# Patient Record
Sex: FEMALE | Race: WHITE | Employment: UNEMPLOYED | URBAN - METROPOLITAN AREA
[De-identification: names, ages, dates, MRNs, and addresses within clinical notes are randomized per-mention and may not be internally consistent; named-entity substitution may affect disease eponyms.]

---

## 2017-07-29 ENCOUNTER — HOSPITAL ENCOUNTER (EMERGENCY)
Age: 27
Discharge: HOME | End: 2017-07-29
Attending: EMERGENCY MEDICINE
Payer: SELF-PAY

## 2017-07-29 VITALS
HEIGHT: 61 IN | OXYGEN SATURATION: 98 % | SYSTOLIC BLOOD PRESSURE: 128 MMHG | HEART RATE: 93 BPM | RESPIRATION RATE: 18 BRPM | WEIGHT: 135 LBS | DIASTOLIC BLOOD PRESSURE: 80 MMHG | BODY MASS INDEX: 25.49 KG/M2 | TEMPERATURE: 98.6 F

## 2017-07-29 DIAGNOSIS — N30.00 ACUTE CYSTITIS WITHOUT HEMATURIA: Primary | ICD-10-CM

## 2017-07-29 LAB
APPEARANCE UR: CLEAR
BACTERIA URNS QL MICRO: ABNORMAL /HPF
BILIRUB UR QL: NEGATIVE
CLUE CELLS VAG QL WET PREP: NORMAL
COLOR UR: ABNORMAL
EPITH CASTS URNS QL MICRO: ABNORMAL /LPF
GLUCOSE UR STRIP.AUTO-MCNC: NEGATIVE MG/DL
HCG UR QL: NEGATIVE
HGB UR QL STRIP: NEGATIVE
HYALINE CASTS URNS QL MICRO: ABNORMAL /LPF (ref 0–5)
KETONES UR QL STRIP.AUTO: NEGATIVE MG/DL
KOH PREP SPEC: NORMAL
LEUKOCYTE ESTERASE UR QL STRIP.AUTO: ABNORMAL
NITRITE UR QL STRIP.AUTO: NEGATIVE
PH UR STRIP: 6.5 [PH] (ref 5–8)
PROT UR STRIP-MCNC: NEGATIVE MG/DL
RBC #/AREA URNS HPF: ABNORMAL /HPF (ref 0–5)
SERVICE CMNT-IMP: NORMAL
SP GR UR REFRACTOMETRY: 1.02 (ref 1–1.03)
T VAGINALIS VAG QL WET PREP: NORMAL
UROBILINOGEN UR QL STRIP.AUTO: 0.2 EU/DL (ref 0.2–1)
WBC URNS QL MICRO: ABNORMAL /HPF (ref 0–4)

## 2017-07-29 PROCEDURE — 99284 EMERGENCY DEPT VISIT MOD MDM: CPT

## 2017-07-29 PROCEDURE — 87210 SMEAR WET MOUNT SALINE/INK: CPT | Performed by: EMERGENCY MEDICINE

## 2017-07-29 PROCEDURE — 81025 URINE PREGNANCY TEST: CPT

## 2017-07-29 PROCEDURE — 87491 CHLMYD TRACH DNA AMP PROBE: CPT | Performed by: EMERGENCY MEDICINE

## 2017-07-29 PROCEDURE — 81001 URINALYSIS AUTO W/SCOPE: CPT | Performed by: EMERGENCY MEDICINE

## 2017-07-29 RX ORDER — CIPROFLOXACIN 500 MG/1
500 TABLET ORAL 2 TIMES DAILY
Qty: 14 TAB | Refills: 0 | Status: SHIPPED | OUTPATIENT
Start: 2017-07-29 | End: 2017-08-05

## 2017-07-29 NOTE — DISCHARGE INSTRUCTIONS
Urinary Tract Infection in Women: Care Instructions  Your Care Instructions    A urinary tract infection, or UTI, is a general term for an infection anywhere between the kidneys and the urethra (where urine comes out). Most UTIs are bladder infections. They often cause pain or burning when you urinate. UTIs are caused by bacteria and can be cured with antibiotics. Be sure to complete your treatment so that the infection goes away. Follow-up care is a key part of your treatment and safety. Be sure to make and go to all appointments, and call your doctor if you are having problems. It's also a good idea to know your test results and keep a list of the medicines you take. How can you care for yourself at home? · Take your antibiotics as directed. Do not stop taking them just because you feel better. You need to take the full course of antibiotics. · Drink extra water and other fluids for the next day or two. This may help wash out the bacteria that are causing the infection. (If you have kidney, heart, or liver disease and have to limit fluids, talk with your doctor before you increase your fluid intake.)  · Avoid drinks that are carbonated or have caffeine. They can irritate the bladder. · Urinate often. Try to empty your bladder each time. · To relieve pain, take a hot bath or lay a heating pad set on low over your lower belly or genital area. Never go to sleep with a heating pad in place. To prevent UTIs  · Drink plenty of water each day. This helps you urinate often, which clears bacteria from your system. (If you have kidney, heart, or liver disease and have to limit fluids, talk with your doctor before you increase your fluid intake.)  · Urinate when you need to. · Urinate right after you have sex. · Change sanitary pads often. · Avoid douches, bubble baths, feminine hygiene sprays, and other feminine hygiene products that have deodorants.   · After going to the bathroom, wipe from front to back.  When should you call for help? Call your doctor now or seek immediate medical care if:  · Symptoms such as fever, chills, nausea, or vomiting get worse or appear for the first time. · You have new pain in your back just below your rib cage. This is called flank pain. · There is new blood or pus in your urine. · You have any problems with your antibiotic medicine. Watch closely for changes in your health, and be sure to contact your doctor if:  · You are not getting better after taking an antibiotic for 2 days. · Your symptoms go away but then come back. Where can you learn more? Go to http://jenny-tej.info/. Enter B713 in the search box to learn more about \"Urinary Tract Infection in Women: Care Instructions. \"  Current as of: November 28, 2016  Content Version: 11.3  © 3845-9646 [employer]. Care instructions adapted under license by [employer] (which disclaims liability or warranty for this information). If you have questions about a medical condition or this instruction, always ask your healthcare professional. [address] any warranty or liability for your use of this information. We hope that we have addressed all of your medical concerns. The examination and treatment you received in the Emergency Department were for an emergent problem and were not intended as complete care. It is important that you follow up with your healthcare provider(s) for ongoing care. If your symptoms worsen or do not improve as expected, and you are unable to reach your usual health care provider(s), you should return to the Emergency Department. Today's healthcare is undergoing tremendous change, and patient satisfaction surveys are one of the many tools to assess the quality of medical care. You may receive a survey from the [employer] organization regarding your experience in the Emergency Department.   I hope that your experience has been completely positive, particularly the medical care that I provided. As such, please participate in the survey; anything less than excellent does not meet my expectations or intentions. [facility] and [facility] participate in nationally recognized quality of care measures. If your blood pressure is greater than 120/80, as reported below, we urge that you seek medical care to address the potential of high blood pressure, commonly known as hypertension. Hypertension can be hereditary or can be caused by certain medical conditions, pain, stress, or \"white coat syndrome. \"       Please make an appointment with your health care provider(s) for follow up of your Emergency Department visit. VITALS:   Patient Vitals for the past 8 hrs:   Temp Pulse Resp BP SpO2   07/29/17 1649 98.6 °F (37 °C) 93 18 128/80 98 %          Thank you for allowing us to provide you with medical care today. We realize that you have many choices for your emergency care needs. Please choose us in the future for any continued health care needs. Tunde Roberts  [facility], [address] [place] [address].   Office: [phone]            Recent Results (from the past 24 hour(s))   URINALYSIS W/MICROSCOPIC    Collection Time: 07/29/17  5:22 PM   Result Value Ref Range    Color YELLOW/STRAW      Appearance CLEAR CLEAR      Specific gravity 1.016 1.003 - 1.030      pH (UA) 6.5 5.0 - 8.0      Protein NEGATIVE  NEG mg/dL    Glucose NEGATIVE  NEG mg/dL    Ketone NEGATIVE  NEG mg/dL    Bilirubin NEGATIVE  NEG      Blood NEGATIVE  NEG      Urobilinogen 0.2 0.2 - 1.0 EU/dL    Nitrites NEGATIVE  NEG      Leukocyte Esterase SMALL (A) NEG      WBC 20-50 0 - 4 /hpf    RBC 5-10 0 - 5 /hpf    Epithelial cells FEW FEW /lpf    Bacteria 1+ (A) NEG /hpf    Hyaline cast 0-2 0 - 5 /lpf   HCG URINE, QL. - POC    Collection Time: 07/29/17  5:23 PM   Result Value Ref Range Pregnancy test,urine (POC) NEGATIVE  NEG     KOH, OTHER SOURCES    Collection Time: 07/29/17  5:35 PM   Result Value Ref Range    Special Requests: NO SPECIAL REQUESTS      KOH NO YEAST SEEN     WET PREP    Collection Time: 07/29/17  5:35 PM   Result Value Ref Range    Clue cells CLUE CELLS ABSENT      Wet prep NO TRICHOMONAS SEEN         No results found.

## 2017-07-29 NOTE — ED PROVIDER NOTES
HPI Comments: 32 y.o. female with no significant past medical history who presents from home with boyfriend at bedside with chief complaint of dysuria. Pt c/o dysuria, suprapubic pain, chills, back pain, and urinary urgency that has persisted for a week. LMP started on the 20th and is current. The pt has had a prior UTI and it feels similar. The pt denies vaginal discharge, fever, dyspareunia, prior abdominal surgery, and prior vaginal infections. There are no other acute medical concerns at this time. Social hx: nonsmoker, no EtOH use  PCP: No primary care provider on file. Note written by Jeremy Victor, as dictated by Judge Daniels. Maicol Carr MD 5:03 PM      The history is provided by the patient. No  was used. No past medical history on file. No past surgical history on file. No family history on file. Social History     Social History    Marital status: N/A     Spouse name: N/A    Number of children: N/A    Years of education: N/A     Occupational History    Not on file. Social History Main Topics    Smoking status: Not on file    Smokeless tobacco: Not on file    Alcohol use Not on file    Drug use: Not on file    Sexual activity: Not on file     Other Topics Concern    Not on file     Social History Narrative    No narrative on file         ALLERGIES: Review of patient's allergies indicates no known allergies. Review of Systems   Constitutional: Negative for chills and fever. HENT: Negative for ear pain and sore throat. Eyes: Negative for pain. Respiratory: Negative for chest tightness and shortness of breath. Cardiovascular: Negative for chest pain and leg swelling. Gastrointestinal: Negative for abdominal pain, nausea and vomiting. Genitourinary: Positive for dyspareunia, dysuria and pelvic pain. Negative for flank pain. Musculoskeletal: Negative for back pain. Skin: Negative for rash.    Neurological: Negative for headaches. All other systems reviewed and are negative. Vitals:    07/29/17 1649   BP: 128/80   Pulse: 93   Resp: 18   Temp: 98.6 °F (37 °C)   SpO2: 98%   Weight: 61.2 kg (135 lb)   Height: 5' 1\" (1.549 m)            Physical Exam   Constitutional: She is oriented to person, place, and time. She appears well-developed and well-nourished. HENT:   Head: Normocephalic and atraumatic. Mouth/Throat: Oropharynx is clear and moist.   Eyes: Conjunctivae and EOM are normal. Pupils are equal, round, and reactive to light. No scleral icterus. Neck: Neck supple. No tracheal deviation present. Cardiovascular: Normal rate, regular rhythm, normal heart sounds and intact distal pulses. Pulmonary/Chest: Effort normal and breath sounds normal. No respiratory distress. Abdominal: Soft. She exhibits no distension. There is no tenderness. There is no rebound and no guarding. Mild B/L abdominal tenderness;     Genitourinary: Vagina normal. No vaginal discharge found. Genitourinary Comments: Left and right adnexal tenderness. No cervical motion tenderness   Musculoskeletal: She exhibits no edema. Neurological: She is alert and oriented to person, place, and time. Skin: Skin is warm and dry. Psychiatric: She has a normal mood and affect. Nursing note and vitals reviewed. Note written by Jeremy Felix, as dictated by Loc Ayers. Soumya Wilkinson MD 5:04 PM      MDM  Number of Diagnoses or Management Options  Acute cystitis without hematuria:   Diagnosis management comments: 32 F with dysuria.     Pelvic exam equivocal  UA shows pyuria and 1+ bacteria  Mild CVA tenderness    A/P: UTI- will treat as early pyelonephritis, Cipro, f/u cultures, Return to the emergency department for new/ worsening symptoms or other concerns       Patient Progress  Patient progress: stable    ED Course       Procedures

## 2017-07-31 LAB
C TRACH DNA SPEC QL NAA+PROBE: NEGATIVE
N GONORRHOEA DNA SPEC QL NAA+PROBE: NEGATIVE
SAMPLE TYPE: NORMAL
SERVICE CMNT-IMP: NORMAL
SPECIMEN SOURCE: NORMAL

## 2018-08-06 LAB
ANTIBODY SCREEN, EXTERNAL: NEGATIVE
HBSAG, EXTERNAL: NEGATIVE
HIV, EXTERNAL: NON REACTIVE
RUBELLA, EXTERNAL: NORMAL
T. PALLIDUM, EXTERNAL: NEGATIVE
TYPE, ABO & RH, EXTERNAL: NORMAL

## 2019-02-27 ENCOUNTER — HOSPITAL ENCOUNTER (INPATIENT)
Age: 29
LOS: 3 days | Discharge: HOME | DRG: 560 | End: 2019-03-02
Attending: OBSTETRICS & GYNECOLOGY | Admitting: OBSTETRICS & GYNECOLOGY
Payer: COMMERCIAL

## 2019-02-27 PROBLEM — O41.00X0 OLIGOHYDRAMNIOS: Status: ACTIVE | Noted: 2019-02-27

## 2019-02-27 LAB
ERYTHROCYTE [DISTWIDTH] IN BLOOD BY AUTOMATED COUNT: 13.5 % (ref 11.5–14.5)
GRBS, EXTERNAL: POSITIVE
HCT VFR BLD AUTO: 37.8 % (ref 35–47)
HGB BLD-MCNC: 12.4 G/DL (ref 11.5–16)
MCH RBC QN AUTO: 32 PG (ref 26–34)
MCHC RBC AUTO-ENTMCNC: 32.8 G/DL (ref 30–36.5)
MCV RBC AUTO: 97.4 FL (ref 80–99)
NRBC # BLD: 0 K/UL (ref 0–0.01)
NRBC BLD-RTO: 0 PER 100 WBC
PLATELET # BLD AUTO: 171 K/UL (ref 150–400)
PMV BLD AUTO: 11.8 FL (ref 8.9–12.9)
RBC # BLD AUTO: 3.88 M/UL (ref 3.8–5.2)
WBC # BLD AUTO: 10.4 K/UL (ref 3.6–11)

## 2019-02-27 PROCEDURE — 85027 COMPLETE CBC AUTOMATED: CPT

## 2019-02-27 PROCEDURE — 3E0P7VZ INTRODUCTION OF HORMONE INTO FEMALE REPRODUCTIVE, VIA NATURAL OR ARTIFICIAL OPENING: ICD-10-PCS | Performed by: OBSTETRICS & GYNECOLOGY

## 2019-02-27 PROCEDURE — 75410000002 HC LABOR FEE PER 1 HR

## 2019-02-27 PROCEDURE — 36415 COLL VENOUS BLD VENIPUNCTURE: CPT

## 2019-02-27 PROCEDURE — 59200 INSERT CERVICAL DILATOR: CPT

## 2019-02-27 PROCEDURE — 65270000029 HC RM PRIVATE

## 2019-02-27 PROCEDURE — 74011250637 HC RX REV CODE- 250/637: Performed by: OBSTETRICS & GYNECOLOGY

## 2019-02-27 RX ORDER — MAG HYDROX/ALUMINUM HYD/SIMETH 200-200-20
30 SUSPENSION, ORAL (FINAL DOSE FORM) ORAL
Status: DISCONTINUED | OUTPATIENT
Start: 2019-02-27 | End: 2019-02-28 | Stop reason: HOSPADM

## 2019-02-27 RX ORDER — SODIUM CHLORIDE 0.9 % (FLUSH) 0.9 %
5-40 SYRINGE (ML) INJECTION EVERY 8 HOURS
Status: DISCONTINUED | OUTPATIENT
Start: 2019-02-27 | End: 2019-03-01

## 2019-02-27 RX ORDER — ONDANSETRON 2 MG/ML
4 INJECTION INTRAMUSCULAR; INTRAVENOUS
Status: DISCONTINUED | OUTPATIENT
Start: 2019-02-27 | End: 2019-02-28 | Stop reason: HOSPADM

## 2019-02-27 RX ORDER — ACETAMINOPHEN 325 MG/1
650 TABLET ORAL
Status: DISCONTINUED | OUTPATIENT
Start: 2019-02-27 | End: 2019-02-28 | Stop reason: HOSPADM

## 2019-02-27 RX ORDER — SODIUM CHLORIDE, SODIUM LACTATE, POTASSIUM CHLORIDE, CALCIUM CHLORIDE 600; 310; 30; 20 MG/100ML; MG/100ML; MG/100ML; MG/100ML
125 INJECTION, SOLUTION INTRAVENOUS CONTINUOUS
Status: DISCONTINUED | OUTPATIENT
Start: 2019-02-27 | End: 2019-03-01

## 2019-02-27 RX ORDER — OXYTOCIN/0.9 % SODIUM CHLORIDE 30/500 ML
0-25 PLASTIC BAG, INJECTION (ML) INTRAVENOUS
Status: DISCONTINUED | OUTPATIENT
Start: 2019-02-28 | End: 2019-03-01

## 2019-02-27 RX ORDER — ZOLPIDEM TARTRATE 5 MG/1
5 TABLET ORAL
Status: DISCONTINUED | OUTPATIENT
Start: 2019-02-27 | End: 2019-03-02 | Stop reason: HOSPADM

## 2019-02-27 RX ORDER — NALBUPHINE HYDROCHLORIDE 10 MG/ML
10 INJECTION, SOLUTION INTRAMUSCULAR; INTRAVENOUS; SUBCUTANEOUS
Status: DISCONTINUED | OUTPATIENT
Start: 2019-02-27 | End: 2019-02-28 | Stop reason: HOSPADM

## 2019-02-27 RX ORDER — BUTALBITAL, ASPIRIN, AND CAFFEINE 325; 50; 40 MG/1; MG/1; MG/1
1 CAPSULE ORAL
COMMUNITY
End: 2019-03-02

## 2019-02-27 RX ORDER — SODIUM CHLORIDE 0.9 % (FLUSH) 0.9 %
5-40 SYRINGE (ML) INJECTION AS NEEDED
Status: DISCONTINUED | OUTPATIENT
Start: 2019-02-27 | End: 2019-03-02 | Stop reason: HOSPADM

## 2019-02-27 RX ORDER — NALOXONE HYDROCHLORIDE 0.4 MG/ML
0.4 INJECTION, SOLUTION INTRAMUSCULAR; INTRAVENOUS; SUBCUTANEOUS AS NEEDED
Status: DISCONTINUED | OUTPATIENT
Start: 2019-02-27 | End: 2019-02-28 | Stop reason: HOSPADM

## 2019-02-27 RX ADMIN — DINOPROSTONE 10 MG: 10 INSERT VAGINAL at 18:57

## 2019-02-27 RX ADMIN — ZOLPIDEM TARTRATE 5 MG: 5 TABLET ORAL at 22:44

## 2019-02-27 NOTE — H&P
History & Physical 
 
Name: Vale Mckenna MRN: [id number]  SSN: [id number] YOB: 1990  Age: 34 y.o. Sex: female Subjective:  
 
Estimated Date of Delivery: 3/3/19 OB History  Para Term  AB Living 1 SAB TAB Ectopic Molar Multiple Live Births # Outcome Date GA Lbr Dilshad/2nd Weight Sex Delivery Anes PTL Lv  
1 Current Ms. Magnus Vargas is admitted with pregnancy at 39w3d for induction of labor due to decreased FM and MARVEL 5 today. Prenatal course was normal.  Please see prenatal records for details. History reviewed. No pertinent past medical history. History reviewed. No pertinent surgical history. Social History Occupational History  Not on file Tobacco Use  Smoking status: Never Smoker  Smokeless tobacco: Never Used Substance and Sexual Activity  Alcohol use: No  
 Drug use: No  
 Sexual activity: Not on file History reviewed. No pertinent family history. No Known Allergies Prior to Admission medications Medication Sig Start Date End Date Taking? Authorizing Provider  
butalbital-aspirin-caffeine [facility]) capsule Take 1 Cap by mouth every four (4) hours as needed for Pain. Yes Provider, Historical  
[id number] ck-folic-dha (PRENATAL DHA+COMPLETE PRENATAL) [id number] mg-mcg-mg cmpk Take  by mouth. Yes Provider, Historical  
aspirin-acetaminophen-caffeine (EXCEDRIN ES) 250-250-65 mg per tablet Take 1 Tab by mouth. Other, MD Derek  
  
 
Review of Systems: A comprehensive review of systems was negative except for that written in the History of Present Illness. Objective:  
 
Vitals: 
Vitals:  
 19 1813 Weight: 85.3 kg (188 lb) Height: 5' 1\" (1.549 m) Physical Exam: 
Patient without distress. Heart: Regular rate and rhythm Lung: clear to auscultation throughout lung fields, no wheezes, no rales, no rhonchi and normal respiratory effort Abdomen: soft, nontender Fundus: soft and non tender Perineum: blood absent, amniotic fluid absent Cervical Exam: 1/60/-2 vertex Lower Extremities:  - Edema No 
Membranes:  Intact Fetal Heart Rate: Reactive EFW: 8# 
Cervidil placed Prenatal Labs: A+, RI, Hep B neg, HIV neg, RPR neg, GBS pos Impression/Plan: Active Problems: 
  Oligohydramnios (2/27/2019) Plan: Admit for induction of labor. Group B Strep positive, will treat prophylactically with ampicillin. Signed By:  Gail Sullivan MD   
 February 27, 2019

## 2019-02-27 NOTE — ROUTINE PROCESS
1805: patient arrived ambulatory to LD3 for scheduled induction. Patient reports positive fetal movement; denies leaking of fluid or bleeding.

## 2019-02-28 ENCOUNTER — ANESTHESIA EVENT (OUTPATIENT)
Dept: LABOR AND DELIVERY | Age: 29
DRG: 560 | End: 2019-02-28
Payer: COMMERCIAL

## 2019-02-28 ENCOUNTER — ANESTHESIA (OUTPATIENT)
Dept: LABOR AND DELIVERY | Age: 29
DRG: 560 | End: 2019-02-28
Payer: COMMERCIAL

## 2019-02-28 PROCEDURE — 77030011943

## 2019-02-28 PROCEDURE — 76060000078 HC EPIDURAL ANESTHESIA

## 2019-02-28 PROCEDURE — 3E033VJ INTRODUCTION OF OTHER HORMONE INTO PERIPHERAL VEIN, PERCUTANEOUS APPROACH: ICD-10-PCS | Performed by: OBSTETRICS & GYNECOLOGY

## 2019-02-28 PROCEDURE — 0HQ9XZZ REPAIR PERINEUM SKIN, EXTERNAL APPROACH: ICD-10-PCS | Performed by: OBSTETRICS & GYNECOLOGY

## 2019-02-28 PROCEDURE — 74011000250 HC RX REV CODE- 250: Performed by: ANESTHESIOLOGY

## 2019-02-28 PROCEDURE — 77030031139 HC SUT VCRL2 J&J -A

## 2019-02-28 PROCEDURE — 00HU33Z INSERTION OF INFUSION DEVICE INTO SPINAL CANAL, PERCUTANEOUS APPROACH: ICD-10-PCS | Performed by: ANESTHESIOLOGY

## 2019-02-28 PROCEDURE — 74011250636 HC RX REV CODE- 250/636: Performed by: ANESTHESIOLOGY

## 2019-02-28 PROCEDURE — 77030014125 HC TY EPDRL BBMI -B: Performed by: ANESTHESIOLOGY

## 2019-02-28 PROCEDURE — 74011000250 HC RX REV CODE- 250

## 2019-02-28 PROCEDURE — A4300 CATH IMPL VASC ACCESS PORTAL: HCPCS

## 2019-02-28 PROCEDURE — 10907ZC DRAINAGE OF AMNIOTIC FLUID, THERAPEUTIC FROM PRODUCTS OF CONCEPTION, VIA NATURAL OR ARTIFICIAL OPENING: ICD-10-PCS | Performed by: OBSTETRICS & GYNECOLOGY

## 2019-02-28 PROCEDURE — 74011000258 HC RX REV CODE- 258: Performed by: OBSTETRICS & GYNECOLOGY

## 2019-02-28 PROCEDURE — 75410000002 HC LABOR FEE PER 1 HR

## 2019-02-28 PROCEDURE — 77030034849

## 2019-02-28 PROCEDURE — 75410000000 HC DELIVERY VAGINAL/SINGLE

## 2019-02-28 PROCEDURE — 74011250636 HC RX REV CODE- 250/636: Performed by: OBSTETRICS & GYNECOLOGY

## 2019-02-28 PROCEDURE — 65410000002 HC RM PRIVATE OB

## 2019-02-28 PROCEDURE — 75410000003 HC RECOV DEL/VAG/CSECN EA 0.5 HR

## 2019-02-28 PROCEDURE — 74011250637 HC RX REV CODE- 250/637: Performed by: OBSTETRICS & GYNECOLOGY

## 2019-02-28 RX ORDER — FENTANYL CITRATE 50 UG/ML
INJECTION, SOLUTION INTRAMUSCULAR; INTRAVENOUS AS NEEDED
Status: DISCONTINUED | OUTPATIENT
Start: 2019-02-28 | End: 2019-02-28 | Stop reason: HOSPADM

## 2019-02-28 RX ORDER — HYDROCORTISONE ACETATE PRAMOXINE HCL 2.5; 1 G/100G; G/100G
CREAM TOPICAL AS NEEDED
Status: DISCONTINUED | OUTPATIENT
Start: 2019-02-28 | End: 2019-03-02 | Stop reason: HOSPADM

## 2019-02-28 RX ORDER — LIDOCAINE HYDROCHLORIDE AND EPINEPHRINE 15; 5 MG/ML; UG/ML
INJECTION, SOLUTION EPIDURAL AS NEEDED
Status: DISCONTINUED | OUTPATIENT
Start: 2019-02-28 | End: 2019-02-28 | Stop reason: HOSPADM

## 2019-02-28 RX ORDER — SIMETHICONE 80 MG
80 TABLET,CHEWABLE ORAL
Status: DISCONTINUED | OUTPATIENT
Start: 2019-02-28 | End: 2019-03-02 | Stop reason: HOSPADM

## 2019-02-28 RX ORDER — IBUPROFEN 400 MG/1
800 TABLET ORAL EVERY 8 HOURS
Status: DISCONTINUED | OUTPATIENT
Start: 2019-02-28 | End: 2019-03-02 | Stop reason: HOSPADM

## 2019-02-28 RX ORDER — NALBUPHINE HYDROCHLORIDE 10 MG/ML
2.5 INJECTION, SOLUTION INTRAMUSCULAR; INTRAVENOUS; SUBCUTANEOUS
Status: DISCONTINUED | OUTPATIENT
Start: 2019-02-28 | End: 2019-02-28 | Stop reason: HOSPADM

## 2019-02-28 RX ORDER — HYDROCORTISONE 1 %
CREAM (GRAM) TOPICAL AS NEEDED
Status: DISCONTINUED | OUTPATIENT
Start: 2019-02-28 | End: 2019-03-02 | Stop reason: HOSPADM

## 2019-02-28 RX ORDER — EPHEDRINE SULFATE 50 MG/ML
10 INJECTION, SOLUTION INTRAVENOUS
Status: DISCONTINUED | OUTPATIENT
Start: 2019-02-28 | End: 2019-02-28 | Stop reason: HOSPADM

## 2019-02-28 RX ORDER — AMMONIA 15 % (W/V)
1 AMPUL (EA) INHALATION AS NEEDED
Status: DISCONTINUED | OUTPATIENT
Start: 2019-02-28 | End: 2019-03-02 | Stop reason: HOSPADM

## 2019-02-28 RX ORDER — BUPIVACAINE HYDROCHLORIDE 5 MG/ML
INJECTION, SOLUTION EPIDURAL; INTRACAUDAL AS NEEDED
Status: DISCONTINUED | OUTPATIENT
Start: 2019-02-28 | End: 2019-02-28 | Stop reason: HOSPADM

## 2019-02-28 RX ORDER — BUPIVACAINE HYDROCHLORIDE 5 MG/ML
30 INJECTION, SOLUTION EPIDURAL; INTRACAUDAL ONCE
Status: DISCONTINUED | OUTPATIENT
Start: 2019-02-28 | End: 2019-02-28 | Stop reason: HOSPADM

## 2019-02-28 RX ORDER — FENTANYL CITRATE 50 UG/ML
100 INJECTION, SOLUTION INTRAMUSCULAR; INTRAVENOUS ONCE
Status: COMPLETED | OUTPATIENT
Start: 2019-02-28 | End: 2019-02-28

## 2019-02-28 RX ORDER — ZOLPIDEM TARTRATE 5 MG/1
5 TABLET ORAL
Status: DISCONTINUED | OUTPATIENT
Start: 2019-02-28 | End: 2019-03-02 | Stop reason: HOSPADM

## 2019-02-28 RX ORDER — ACETAMINOPHEN 325 MG/1
650 TABLET ORAL
Status: DISCONTINUED | OUTPATIENT
Start: 2019-02-28 | End: 2019-03-02 | Stop reason: HOSPADM

## 2019-02-28 RX ORDER — SODIUM CHLORIDE 0.9 % (FLUSH) 0.9 %
5-40 SYRINGE (ML) INJECTION AS NEEDED
Status: DISCONTINUED | OUTPATIENT
Start: 2019-02-28 | End: 2019-03-02 | Stop reason: HOSPADM

## 2019-02-28 RX ORDER — OXYCODONE AND ACETAMINOPHEN 5; 325 MG/1; MG/1
2 TABLET ORAL
Status: DISCONTINUED | OUTPATIENT
Start: 2019-02-28 | End: 2019-03-02 | Stop reason: HOSPADM

## 2019-02-28 RX ORDER — OXYTOCIN/RINGER'S LACTATE 20/1000 ML
999 PLASTIC BAG, INJECTION (ML) INTRAVENOUS AS NEEDED
Status: DISCONTINUED | OUTPATIENT
Start: 2019-02-28 | End: 2019-03-02 | Stop reason: HOSPADM

## 2019-02-28 RX ORDER — DOCUSATE SODIUM 100 MG/1
100 CAPSULE, LIQUID FILLED ORAL
Status: DISCONTINUED | OUTPATIENT
Start: 2019-02-28 | End: 2019-03-02 | Stop reason: HOSPADM

## 2019-02-28 RX ORDER — DIPHENHYDRAMINE HCL 25 MG
25 CAPSULE ORAL
Status: DISCONTINUED | OUTPATIENT
Start: 2019-02-28 | End: 2019-03-02 | Stop reason: HOSPADM

## 2019-02-28 RX ORDER — SODIUM CHLORIDE 0.9 % (FLUSH) 0.9 %
5-40 SYRINGE (ML) INJECTION EVERY 8 HOURS
Status: DISCONTINUED | OUTPATIENT
Start: 2019-02-28 | End: 2019-03-01

## 2019-02-28 RX ORDER — ONDANSETRON 2 MG/ML
4 INJECTION INTRAMUSCULAR; INTRAVENOUS
Status: DISCONTINUED | OUTPATIENT
Start: 2019-02-28 | End: 2019-03-02 | Stop reason: HOSPADM

## 2019-02-28 RX ORDER — OXYTOCIN/RINGER'S LACTATE 20/1000 ML
125 PLASTIC BAG, INJECTION (ML) INTRAVENOUS AS NEEDED
Status: DISCONTINUED | OUTPATIENT
Start: 2019-02-28 | End: 2019-03-02 | Stop reason: HOSPADM

## 2019-02-28 RX ORDER — NALOXONE HYDROCHLORIDE 0.4 MG/ML
0.4 INJECTION, SOLUTION INTRAMUSCULAR; INTRAVENOUS; SUBCUTANEOUS AS NEEDED
Status: DISCONTINUED | OUTPATIENT
Start: 2019-02-28 | End: 2019-02-28 | Stop reason: HOSPADM

## 2019-02-28 RX ORDER — OXYCODONE AND ACETAMINOPHEN 5; 325 MG/1; MG/1
1 TABLET ORAL
Status: DISCONTINUED | OUTPATIENT
Start: 2019-02-28 | End: 2019-03-02 | Stop reason: HOSPADM

## 2019-02-28 RX ORDER — FENTANYL/BUPIVACAINE/NS/PF 2-1250MCG
1-16 PREFILLED PUMP RESERVOIR EPIDURAL CONTINUOUS
Status: DISCONTINUED | OUTPATIENT
Start: 2019-02-28 | End: 2019-03-01

## 2019-02-28 RX ADMIN — SODIUM CHLORIDE, SODIUM LACTATE, POTASSIUM CHLORIDE, AND CALCIUM CHLORIDE 125 ML/HR: 600; 310; 30; 20 INJECTION, SOLUTION INTRAVENOUS at 07:00

## 2019-02-28 RX ADMIN — SODIUM CHLORIDE, SODIUM LACTATE, POTASSIUM CHLORIDE, AND CALCIUM CHLORIDE 500 ML: 600; 310; 30; 20 INJECTION, SOLUTION INTRAVENOUS at 11:02

## 2019-02-28 RX ADMIN — OXYTOCIN-SODIUM CHLORIDE 0.9% IV SOLN 30 UNIT/500ML 2 MILLI-UNITS/MIN: 30-0.9/5 SOLUTION at 07:00

## 2019-02-28 RX ADMIN — BUPIVACAINE HYDROCHLORIDE 2 ML: 5 INJECTION, SOLUTION EPIDURAL; INTRACAUDAL at 11:09

## 2019-02-28 RX ADMIN — AMPICILLIN 2 G: 2 INJECTION, POWDER, FOR SOLUTION INTRAVENOUS at 07:00

## 2019-02-28 RX ADMIN — IBUPROFEN 800 MG: 400 TABLET ORAL at 19:14

## 2019-02-28 RX ADMIN — Medication 10 ML/HR: at 11:15

## 2019-02-28 RX ADMIN — LIDOCAINE HYDROCHLORIDE AND EPINEPHRINE 5 ML: 15; 5 INJECTION, SOLUTION EPIDURAL at 11:05

## 2019-02-28 RX ADMIN — BUPIVACAINE HYDROCHLORIDE 3 ML: 5 INJECTION, SOLUTION EPIDURAL; INTRACAUDAL at 11:10

## 2019-02-28 RX ADMIN — AMPICILLIN SODIUM 1 G: 1 INJECTION, POWDER, FOR SOLUTION INTRAMUSCULAR; INTRAVENOUS at 11:05

## 2019-02-28 RX ADMIN — FENTANYL CITRATE 100 MCG: 50 INJECTION, SOLUTION INTRAMUSCULAR; INTRAVENOUS at 11:08

## 2019-02-28 RX ADMIN — FENTANYL CITRATE 100 MCG: 50 INJECTION, SOLUTION INTRAMUSCULAR; INTRAVENOUS at 11:07

## 2019-02-28 RX ADMIN — AMPICILLIN SODIUM 1 G: 1 INJECTION, POWDER, FOR SOLUTION INTRAMUSCULAR; INTRAVENOUS at 14:56

## 2019-02-28 NOTE — PROGRESS NOTES
0745 Bedside and Verbal shift change report given to CASANDRA Bonilla RN (oncoming nurse) by Sarah Richard. Isak Flores RN (offgoing nurse). Report included the following information SBAR, MAR and Recent Results. 9974 Dr Randa Riedel at bedside and reviewed fhr strip. SVE 3/70/-3 
 
1035 Patient requesting epidural. LR bolus started 1 Dr Teo Roy notified and on way for epidural 
 
[place] Dr Teo Roy at bedside for epidural 
 
1145 Bedside and Verbal shift change report given to RONY Spencer RN (oncoming nurse) by CASANDRA Bonilla RN (offgoing nurse). Report included the following information SBAR, Intake/Output, MAR and Recent Results.

## 2019-02-28 NOTE — PROGRESS NOTES
Labor Progress Note 
 
34 y.o  at 39.3 wks. Was seen in office today with c/o decreased FM. MARVEL was 5cm so plan for IOL was mad. Cervidil was placed tonight by Dr. Sara Gilman. Plan for pitocin in the morning. Pt currently feeling FM. Denies any sig med/ob hx. FOB and friend at bedside. Physical Exam: 
Cervical Exam: not examined Membranes:  Intact Uterine Activity: Frequency: occasional, mild, not felt by pt Fetal Heart Rate: Reactive,130 moderate varaibility Accelerations: yes Decelerations: none Assessment/Plan: 
IUP 39.3 Reassuring FWB Borderline oligohydramnios Cervidil IOL, plan for pit At 0700 Aranza Vivas CNM

## 2019-02-28 NOTE — PROGRESS NOTES
Labor Progress Note Patient seen, fetal heart rate and contraction pattern evaluated, patient examined. Comfortable with epidural. 
Patient Vitals for the past 1 hrs: 
 BP Temp Pulse Resp SpO2  
02/28/19 1200 122/66 98.4 °F (36.9 °C) 81 18   
02/28/19 1156     99 % Physical Exam: 
Cervical Exam:  5/80/-3 Membranes:  Artificial Rupture of Membranes; Amniotic Fluid: small amount of thick meconium fluid Uterine Activity: ctx every 2, pit at 8; iupc inserted Fetal Heart Rate: 150s mod +accels prior to AROM; recurrent mild variables Assessment/Plan: 
IOL due to decreased FM, MARVEL 5 at 39+ weeks. Progressing however head position has not descended - discussed with pt and family I'm not optimistic for a vaginal birth due to possible CPD. IUPC placed to ensure adequate ctx. Will work on position changes now to see if variables resolve. GBS positive s/p 2 doses of antibiotics.

## 2019-02-28 NOTE — PROGRESS NOTES
Labor Progress Note Patient seen, fetal heart rate and contraction pattern evaluated, patient examined. C/o cramps Visit Vitals /90 Pulse 80 Temp 98.4 °F (36.9 °C) Resp 16 Ht 5' 1\" (1.549 m) Wt 85.3 kg (188 lb) BMI 35.52 kg/m² Physical Exam: 
Cervical Exam:  3/70/-3 Membranes:  Intact Uterine Activity: irregular ctx, pit at 6 Fetal Heart Rate: reassuring Assessment/Plan: 
iol due to meagan 5 with decreased fm. Cervical ripening successful-continue pitocin. fht reassuring.

## 2019-02-28 NOTE — ANESTHESIA PROCEDURE NOTES
Epidural Block Performed by: Comfort Chacon DO Authorized by: Comfort Chacon DO  
 
Pre-Procedure Indication: labor epidural   
Preanesthetic Checklist: patient identified, risks and benefits discussed, anesthesia consent, site marked, patient being monitored, timeout performed and anesthesia consent Epidural:  
Patient position:  Seated Prep region:  Lumbar Prep: Chlorhexidine Location:  L3-4 Needle and Epidural Catheter:  
Needle Type:  Tuohy Needle Gauge:  17 G Injection Technique:  Loss of resistance using air Attempts:  1 Catheter Size:  19 G Events: no blood with aspiration, no cerebrospinal fluid with aspiration, no paresthesia and negative aspiration test   
Test Dose:  Bupivacaine 0.25% and negative Assessment:  
Catheter Secured:  Tegaderm and tape Insertion:  Uncomplicated Patient tolerance:  Patient tolerated the procedure well with no immediate complications Patient reported comfort

## 2019-02-28 NOTE — PROGRESS NOTES
1940 Bedside and Verbal shift change report given to RYLAND Al, RN by CINTHIA Petersen, RN. Report included the following information SBAR, Intake/Output and MAR.  
 
1943 MAYNOR Castillo at the bedside. Reviewing FHT and discussing plan of care. 0600 Cervidil removed by patient. Cervidil intact. Patient up to shower before starting Pitocin at 0700. 
 
0745 Bedside and Verbal shift change report given to CASANDRA Hannon RN by Kinsey Al, ANGELA. Report included the following information SBAR, Intake/Output and MAR.

## 2019-02-28 NOTE — PROGRESS NOTES
Bedside and Verbal shift change report given to SRINATH Rhodes RN (oncoming nurse) by CASANDRA Day RN (offgoing nurse). Report included the following information SBAR, Kardex, Intake/Output, MAR and Recent Results. Pt recently got her epidural, states comfortable at present. Turned to R side. 1230:  Dr. Valerio Boas at bedside, cervical exam:  80/-3, AROM per Dr. Valerio Boas, noted small amt of thick meconium. IUPC placed per Dr. Valerio Boas. Dr. Valerio Boas discussed with pt regarding possible large size of baby and possibility of c/section. 1244:  Spoke with Dr. Valerio Boas about recurrent variable decels and rising FHR BL, Dr. Valerio Boas viewed FHR strip, states we will continue to observe;  pt's position changed. 1500:  Notified Dr. Valerio Boas of pt's increased rectal pressure, requested for nurse to do cervical exam. 
[phone] 90:  Cervical exam per Pablo Cedeno RN:  10/100/0/+1. 
1525:  Dr. Valerio Boas notified of pt being complete and will begin pushing. 1542:  Pt beginning to push, nurse is at bedside continuously while pushing. 1628:  Dr. Valerio Boas notified have been pushing for 1 hour, asked to come evaluate pt. States she will be over soon. 1650:  Pushing with every other UC. 
1658:  Dr. Valerio Boas arrived for delivery. 1715:  Delivered viable female infant  per Dr. Valerio Boas, no episiotomy, 1st degree perineal laceration repaired by Dr. Valerio Boas. 1846:  Infant to R breast.  Discussed with mother her plan for feeding her baby. Reviewed the benefits and management of exclusive breast milk feeding as well as the importance of latching within the first hour after delivery. Instruction provided on how to recognize hunger cues and initiate breast feeding in response to those cues. Patient confirms breast milk feed exclusively as intended feeding method at this time. 1850:  Pt bathed, Epidural cath removed intact, no s/s of infection. :  TRANSFER - OUT REPORT: 
 
Verbal report given to RYLAND Chirinos RN(name) on [employer]  being transferred to [place] [address](unit) for routine progression of care Report consisted of patients Situation, Background, Assessment and  
Recommendations(SBAR). Information from the following report(s) SBAR, Kardex, Intake/Output, MAR and Recent Results was reviewed with the receiving nurse. Lines:  
Peripheral IV 02/27/19 Left Arm (Active) Site Assessment Clean, dry, & intact 2/28/2019  7:47 AM  
Phlebitis Assessment 0 2/28/2019  7:47 AM  
Infiltration Assessment 0 2/28/2019  7:47 AM  
Dressing Status Clean, dry, & intact 2/28/2019  7:47 AM  
Dressing Type Transparent;Tape 2/28/2019  7:47 AM  
Hub Color/Line Status Green 2/28/2019  7:47 AM  
  
 
Opportunity for questions and clarification was provided. Patient transported with: 
 Registered Nurse

## 2019-02-28 NOTE — ANESTHESIA PREPROCEDURE EVALUATION
Anesthetic History No history of anesthetic complications Review of Systems / Medical History Patient summary reviewed, nursing notes reviewed and pertinent labs reviewed Pulmonary Within defined limits Neuro/Psych Within defined limits Cardiovascular Within defined limits Exercise tolerance: >4 METS 
  
GI/Hepatic/Renal 
Within defined limits Endo/Other Within defined limits Other Findings Comments: Oligohydramnios IOL Physical Exam 
 
Airway Mallampati: II 
TM Distance: 4 - 6 cm Neck ROM: normal range of motion Mouth opening: Normal 
 
 Cardiovascular Regular rate and rhythm,  S1 and S2 normal,  no murmur, click, rub, or gallop Rhythm: regular Rate: normal 
 
 
 
 Dental 
No notable dental hx Pulmonary Breath sounds clear to auscultation Abdominal 
GI exam deferred Other Findings Anesthetic Plan ASA: 2 Anesthesia type: epidural 
 
 
 
 
 
Anesthetic plan and risks discussed with: Patient and Spouse

## 2019-02-28 NOTE — PROCEDURES
Delivery Note    Obstetrician:  Don Morales MD    Assistant: none    Pre-Delivery Diagnosis: 39 weeks, oligo, decreased fetal movement, meconium    Post-Delivery Diagnosis: same viable female infant    Intrapartum Event: called to bedside to assess pushing efforts and at the time of arrival pt was . Uneventful atraumatic . Umbilical cord around the shoulder. Baby not vigorous at birth and due to thick meconium NICU team called. Pt crying by time placed on the warmer. Baby was back on mom's chest during repair.     Procedure: Spontaneous vaginal delivery    Epidural: YES    Monitor:  Fetal Heart Tones - External and Uterine Contractions - Internal    Indications for instrumental delivery: none    Estimated Blood Loss: 300    Episiotomy: n/a    Laceration(s):  1st degree at 7 oclock of vagina and left labial    Laceration(s) repair: 2-0 vicryl running locked suture placed for 1st degree laceration; left labial repaired with 3-0 vicryl interrupted sutures    Presentation: Cephalic    Fetal Description: higgins    Fetal Position: Occiput Anterior    Birth Weight: pending    Birth Length: pending    Apgar - One Minute: 6    Apgar - Five Minutes: not reported by NICU team at time of this note    Umbilical Cord: none    Specimens: none           Complications:  none           Cord Blood Results:   Information for the patient's :  Ifeoma Lai [[id number]]   No results found for: PCTABR, PCTDIG, BILI, ABORH    Prenatal Labs:     Lab Results   Component Value Date/Time    HBsAg, External NEGATIVE 2018    HIV, External NON REACTIVE 2018    Rubella, External IMMUNE 2018    GrBStrep, External POSITIVE 2019        Attending Attestation: I was present and scrubbed for the entire procedure    Signed By:  Don Morales MD     2019

## 2019-03-01 PROCEDURE — 74011250637 HC RX REV CODE- 250/637: Performed by: OBSTETRICS & GYNECOLOGY

## 2019-03-01 PROCEDURE — 65410000002 HC RM PRIVATE OB

## 2019-03-01 RX ADMIN — IBUPROFEN 800 MG: 400 TABLET ORAL at 17:24

## 2019-03-01 RX ADMIN — DOCUSATE SODIUM 100 MG: 100 CAPSULE, LIQUID FILLED ORAL at 19:01

## 2019-03-01 RX ADMIN — IBUPROFEN 800 MG: 400 TABLET ORAL at 05:19

## 2019-03-01 NOTE — LACTATION NOTE
This note was copied from a baby's chart. Initial Lactation Consultation - Baby born vaginally yesterday afternoon to a  mom at 44 4/7 weeks gestation. Mom says the baby has been latching and nursing on the right breast but she is having difficulty getting the baby to latch on the left breast.  
 
I helped mom get the baby positioned next to her in the football hold. After several attempts we were able to get the baby latched deeply on the left breast. She began sucking rhythmically with a few audible swallows. Feeding Plan: Mother will keep baby skin to skin as often as possible, feed on demand, respond to feeding cues, obtain latch, listen for audible swallowing, be aware of signs of oxytocin release/ cramping, thirst and sleepiness while breastfeeding. Mom will not limit the time the baby at the breast. She will allow the baby to completely finish one breast and then offer the second breast at each feeding. She will call out as needed for assistance getting the baby latched.

## 2019-03-01 NOTE — PROGRESS NOTES
Bedside shift change report given to Pankaj Peters (oncoming nurse) by Liisa Schirmer (offgoing nurse). Report included the following information SBAR, Kardex, MAR and Recent Results.

## 2019-03-01 NOTE — PROGRESS NOTES
Post-Partum Day Number 1 Progress Note Patient doing well post-partum without significant complaint. Voiding withour difficulty, normal lochia. Has not slept in >24 hours. Took an [place] but didn't help much. Vitals:   
Patient Vitals for the past 8 hrs: 
 BP Temp Pulse Resp  
19 0505 122/75 98 °F (36.7 °C) (!) 110 16 Temp (24hrs), Av.6 °F (37 °C), Min:98 °F (36.7 °C), Max:99.7 °F (37.6 °C) Vital signs stable, afebrile. Exam:  Patient without distress. Abdomen soft, fundus firm at level of umbilicus, nontender Lower extremities are negative for swelling, cords or tenderness. Lab/Data Review: All lab results for the last 24 hours reviewed. Assessment and Plan:  Patient appears to be having uncomplicated post-partum course. Recommended having FOB watch baby or take baby to nursery at some point today so that she can get some sleep. Continue routine perineal care and maternal education. Plan discharge tomorrow if no problems occur. Rhpos/RI/baby girl.  
 
Stephanie Spivey MD  
3/1/2019 
7:52 AM

## 2019-03-01 NOTE — ROUTINE PROCESS
1954: TRANSFER - IN REPORT: 
 
Verbal report received from Amelie(name) on Ken Degrootas  being received from L+D(unit) for routine progression of care Report consisted of patients Situation, Background, Assessment and  
Recommendations(SBAR). Information from the following report(s) SBAR was reviewed with the receiving nurse. Opportunity for questions and clarification was provided. Assessment completed upon patients arrival to unit and care assumed. 2315: Pt ambulated to bathroom with nursing assistance x 1 with no complications. Pt voided x 1; DTV by 6700. 
 
0215: Pt ambulated to bathroom with nursing assistance x 1 with no complications. Pt voided; check void complete. Pt now up ad richy.

## 2019-03-02 VITALS
HEART RATE: 103 BPM | BODY MASS INDEX: 35.5 KG/M2 | TEMPERATURE: 97.6 F | SYSTOLIC BLOOD PRESSURE: 124 MMHG | HEIGHT: 61 IN | RESPIRATION RATE: 14 BRPM | WEIGHT: 188 LBS | OXYGEN SATURATION: 98 % | DIASTOLIC BLOOD PRESSURE: 86 MMHG

## 2019-03-02 PROCEDURE — 74011250637 HC RX REV CODE- 250/637: Performed by: OBSTETRICS & GYNECOLOGY

## 2019-03-02 RX ORDER — IBUPROFEN 800 MG/1
800 TABLET ORAL
Qty: 30 TAB | Refills: 1 | Status: SHIPPED | OUTPATIENT
Start: 2019-03-02 | End: 2021-10-11 | Stop reason: ALTCHOICE

## 2019-03-02 RX ADMIN — DOCUSATE SODIUM 100 MG: 100 CAPSULE, LIQUID FILLED ORAL at 14:32

## 2019-03-02 RX ADMIN — IBUPROFEN 800 MG: 400 TABLET ORAL at 05:30

## 2019-03-02 RX ADMIN — IBUPROFEN 800 MG: 400 TABLET ORAL at 14:32

## 2019-03-02 RX ADMIN — MUPIROCIN: 20 OINTMENT TOPICAL at 15:17

## 2019-03-02 NOTE — LACTATION NOTE
This note was copied from a baby's chart. Mom and baby scheduled for discharge today. Mom says baby is able to get a deep latch and will nurse but has become very sleepy. Baby has an elevated bilirubin level and will be going home on a bili blanket. I helped mom latch the baby this morning. Baby got a deep latch and was sucking rhythmically with a few audible swallows. She tired quickly. I showed mom and dad how to syringe feed formula at the breast. I recommended that she give her 10-15 cc's of formula with each breast feeding. She should attempt to feed at least every 3 hours but sooner if the baby is acting hungry. I had mom pump after nursing. We reviewed cluster feeding and engorgement. Breast feeding teaching completed and questions answered. I helped mom develop a feeding plan for the baby that included breast feeding and syringe feeding formula.

## 2019-03-02 NOTE — PROGRESS NOTES
0745 Bedside shift change report given to Cordeliadonna 83 (oncoming nurse) by Antwon Garnica RN (offgoing nurse). Report included the following information SBAR, Kardex, MAR and Recent Results. Plan for DC today. 1036 Call placed via exchange re: pt's cracked R nipple and request for Sindhu Hailstone Burgos's cream. Pt states she forgot to mention to dr during rounds. Awaiting return call. 1040 Received return call from Dr. Isma Burleson who plans to call in script for Sindhu Hailstone Burgos's cream to [facility] End location this afternoon. Will add to discharge instructions and inform pt of same. 1310 Call placed to [facility] that is closing in one hour. Per their staff, compounding meds aren't done on weekends and the soonest Sindhu Hailstone Burgos's cream would be available is Tuesday (3 days). 1315 Paged Dr. Isma Burleson re: Sindhu Hailstone Burgos's cream. Received return call. See new orders. 1525 I have reviewed discharge instructions with the patient and significant other. The patient and significant other (boyfriend Tamara Montgomery) verbalized understanding. Mother states she has all belongings in her possession upon discharge. Verbalizes appreciation for staff's care. To be transported off unit per w/c holding infant in her lap accompanied by staff. To be transported home per her vehicle with SO driving.

## 2019-03-02 NOTE — DISCHARGE INSTRUCTIONS
Breastfeeding Support Group  [employer] [facility] meets the 1st and 3rd Tuesday of each month in the [facility] from 10:00-11:00, (located behind [facility] on the first floor) Meetings are facilitated by board certified lactation consultants and all breastfeeding moms and their infants are invited. POSTPARTUM DISCHARGE INSTRUCTIONS        Attending Physician:  Farzad King MD    Delivery Type:  Vaginal Childbirth with Episiotomy, Laceration or Tear: What To Expect At [facility] body will slowly heal in the next few weeks. It is easy to get too tired and overwhelmed during the first weeks after your baby is born. Changes in your hormones can shift your mood without warning. You may find it hard to meet the extra demands on your energy and time. Take it easy on yourself. Follow-up care is a key part of your treatment and safety. Be sure to make and go to all appointments, and call your doctor if you are having problems. It's also a good idea to know your test results and keep a list of the medicines you take. How can you care for yourself at home? Vaginal Bleeding and Cramps  · After delivery, you will have a bloody discharge from the vagina. This will turn pink within a week and then white or yellow after about 10 days. It may last for 2 to 4 weeks or longer, until the uterus has healed. Use pads instead of tampons until you stop bleeding. · Do not worry if you pass some blood clots, as long as they are smaller than a golf ball. If you have a tear or stitches in your vaginal area, change the pad at least every 4 hours to prevent soreness and infection. · You may have cramps for the first few days after childbirth. These are normal and occur as the uterus shrinks to normal size. Take an over-the-counter pain medicine, such as acetaminophen (Tylenol), ibuprofen (Advil, Motrin), or naproxen (Aleve), for cramps.  Read and follow all instructions on the label. Do not take aspirin, because it can cause more bleeding. Do not take acetaminophen (Tylenol) and other acetaminophen containing medications (i.e. Percocet) at the same time. Episiotomy, Lacerations or Tears  · If you have stitches, they will dissolve on their own and do not need to be removed. · Put ice or a cold pack on your painful area for 10 to 20 minutes at a time, several times a day, for the first few days. Put a thin cloth between the ice and your skin. · You can sit in a few inches of warm water (sitz bath) 3 times a day and after bowel movements. The warm water helps with pain and itching. If you do not have a tub, a warm shower might help. Breast feeding  · Your breasts may overfill (engorge) in the first few days after delivery. To help milk flow and to relieve pain, warm your breasts in the shower or by using warm, moist towels before nursing. · If you are not nursing, do not put warmth on your breasts or touch your breasts. Wear a tight bra or sports bra and use ice until the fullness goes away. This usually takes 2 to 3 days. · Put ice or a cold pack on your breast after nursing to reduce swelling and pain. Put a thin cloth between the ice and your skin. · Use the Caretha Marker Burgos's cream on any skin breakdown on your nipples. If your nipples are intact you can use lanolin to soothe your skin. Activity  · Eat a balanced diet. Do not try to lose weight by cutting calories. Keep taking your prenatal vitamins, or take a multivitamin. · Get as much rest as you can. Try to take naps when your baby sleeps during the day. · Get some exercise (walking) every day. But do not do any heavy exercise until your doctor says it is okay. Walk a little more each day than you did the day before. · Wait until you are healed (about 4 to 6 weeks) before you have sexual intercourse. Your doctor will tell you when it is okay to have sex. · Talk to your doctor about birth control.  You can get pregnant even before your period returns. Also, you can get pregnant while you are breast-feeding. Mental Health  · Many women get the \"baby blues\" during the first few days after childbirth. You may lose sleep, feel irritable, and cry easily. You may feel happy one minute and sad the next. Hormone changes are one cause of these emotional changes. Also, the demands of a new baby, along with visits from relatives or other family needs, add to a mother's stress. The \"baby blues\" often peak around the fourth day. Then they ease up in less than 2 weeks. · If your moodiness or anxiety lasts for more than 2 weeks, or if you feel like life is not worth living, you may have postpartum depression. This is different for each mother. Some mothers with serious depression may worry intensely about their infant's well-being. Others may feel distant from their child. Some mothers might even feel that they might harm their baby. A mother may have signs of paranoia, wondering if someone is watching her. · With all the changes in your life, you may not know if you are depressed. Pregnancy sometimes causes changes in how you feel that are similar to the symptoms of depression. · Symptoms of depression include:  · Feeling sad or hopeless and losing interest in daily activities. These are the most common symptoms of depression. · Sleeping too much or not enough. · Feeling tired. You may feel as if you have no energy. · Eating too much or too little. · POSTPARTUM SUPPORT INTERNATIONAL (PSI) offers a Warm line; Chat with the Expert phone sessions; Information and Articles about Pregnancy and Postpartum Mood Disorders; Comprehensive List of Free Support Groups; Knowledgeable local coordinators who will offer support, information, and resources; Guide to Resources on Plored; Calendar of events in the  mood disorders community; Latest News and Research; and [facility] [address] for [employer].  Remember - You are not alone; You are not to blame; With help, you will be well. 4-694-942-PPD(3956). WWW. POSTPARTUM. NET    · Writing or talking about death, such as writing suicide notes or talking about guns, knives, or pills. Keep the numbers for these national suicide hotlines: 9-081-785-TALK (6-700.414.2660) and 3-111-IKECDBT (7-961.108.6989). If you or someone you know talks about suicide or feeling hopeless, get help right away. Constipation and Hemorrhoids  Drink plenty of fluids, enough so that your urine is light yellow or clear like water. If you have kidney, heart, or liver disease and have to limit fluids, talk with your doctor before you increase the amount of fluids you drink. · Eat plenty of fiber each day. Have a bran muffin or bran cereal for breakfast, and try eating a piece of fruit for a mid-afternoon snack. · For painful, itchy hemorrhoids, put ice or a cold pack on the area several times a day for 10 minutes at a time. Follow this by putting a warm compress on the area for another 10 to 20 minutes or by sitting in a shallow, warm bath. · You can take an over the counter stool softener daily. When should you call for help? Call 911 anytime you think you may need emergency care. For example, call if:  · You are thinking of hurting yourself, your baby, or anyone else. · You passed out (lost consciousness). · You have symptoms of a blood clot in your lung (called a pulmonary embolism). These may include:  · Sudden chest pain. · Trouble breathing. · Coughing up blood. Call your doctor now or seek immediate medical care if:  · You have severe vaginal bleeding. · You are soaking through a pad each hour for 2 or more hours. · Your vaginal bleeding seems to be getting heavier or is still bright red 4 days after delivery. · You are dizzy or lightheaded, or you feel like you may faint. · You are vomiting or cannot keep fluids down. · You have a fever. · You have new or more belly pain.   · You pass tissue (not just blood). · Your vaginal discharge smells bad. · Your belly feels tender or full and hard. · Your breasts are continuously painful or red. · You feel sad, anxious, or hopeless for more than a few days. · You have sudden, severe pain in your belly. · You have symptoms of a blood clot in your leg (called a deep vein thrombosis), such as:  · Pain in your calf, back of the knee, thigh, or groin. · Redness and swelling in your leg or groin. · You have symptoms of preeclampsia, such as:  · Sudden swelling of your face, hands, or feet. · New vision problems (such as dimness or blurring). · A severe headache. · Your blood pressure is higher than it should be or rises suddenly. · You have new nausea or vomiting. Watch closely for changes in your health, and be sure to contact your doctor if you have any problems. Additional Information:  Postpartum Support    PARENTS:  Are you feeling sad or depressed? Is it difficult for you to enjoy yourself? Do you feel more irritable or tense? Do you feel anxious or panicky? Are you having difficulty bonding with your baby? Do you feel as if you are \"out of control\" or \"going crazy\"? Are you worried that you might hurt your baby or yourself? FAMILIES: Do you worry that something is wrong but don't know how to help? Do you think that your partner or spouse is having problems coping? Are you worried that it may never get better? While many women experience some mild mood change or \"the blues\" during or after the birth of a child, 1 in 9 women experience more significant symptoms of depression or anxiety. 1 in 10 Dads become depressed during the first year. Things you can do  Being a good parent includes taking care of yourself. If you take care of yourself, you will be able to take better care of your baby and your family. · Talk to a counselor or healthcare provider who has training in  mood and anxiety problems.   · Learn as much as you can about pregnancy and postpartum depression and anxiety. · Get support from family and friends. Ask for help when you need it. · Join a support group in your area or online. · Keep active by walking, stretching or whatever form of exercise helps you to feel better. · Get enough rest and time for yourself. · Eat a healthy diet. · Don't give up! It may take more than one try to get the right help you need. These are general instructions for a healthy lifestyle:    No smoking/ No tobacco products/ Avoid exposure to second hand smoke    Surgeon General's Warning:  Quitting smoking now greatly reduces serious risk to your health. Obesity, smoking, and sedentary lifestyle greatly increases your risk for illness    A healthy diet, regular physical exercise & weight monitoring are important for maintaining a healthy lifestyle    Recognize signs and symptoms of STROKE:    F-face looks uneven    A-arms unable to move or move unevenly    S-speech slurred or non-existent    T-time-call 911 as soon as signs and symptoms begin - DO NOT go       back to bed or wait to see if you get better - TIME IS BRAIN. I have had the opportunity to make my options or choices for discharge. I have received and understand these instructions.

## 2019-03-02 NOTE — DISCHARGE SUMMARY
Obstetrical Discharge Summary     Name: Torsten Sargent MRN: [id number]  SSN: [id number]    YOB: 1990  Age: 34 y.o. Sex: female      Allergies: Patient has no known allergies. Admit Date: 2019    Discharge Date: 3/2/2019     Admitting Physician: Magalie Copeland MD     Attending Physician:  Tessa Hayes MD     * Admission Diagnoses: Oligohydramnios [O41.00X0]    * Discharge Diagnoses:   Information for the patient's :  Smyth Patches [[id number]]   Delivery of a 3.47 kg female infant via Vaginal, Spontaneous on 2019 at 5:15 PM  by . Apgars were 6 and 8. Additional Diagnoses:   Hospital Problems as of 3/2/2019 Never Reviewed          Codes Class Noted - Resolved POA    Oligohydramnios ICD-10-CM: O41.00X0  ICD-9-CM: 658.00  2019 - Present Unknown             Lab Results   Component Value Date/Time    Rubella, External IMMUNE 2018    GrBStrep, External POSITIVE 2019    ABO,Rh A POSITIVE 2018      There is no immunization history on file for this patient. * Procedures: Epidural anesthesia. Spontaneous vaginal delivery  * No surgery found *           * Discharge Condition: good    [facility] Course: Normal hospital course following the delivery. * Disposition: Home    Discharge Medications:   Current Discharge Medication List      START taking these medications    Details   ibuprofen (MOTRIN) 800 mg tablet Take 1 Tab by mouth every eight (8) hours as needed for Pain. Qty: 30 Tab, Refills: 1         CONTINUE these medications which have NOT CHANGED    Details   [id number] ck-folic-dha (PRENATAL DHA+COMPLETE PRENATAL) -300 mg-mcg-mg cmpk Take  by mouth.          STOP taking these medications       butalbital-aspirin-caffeine (FIORINAL) capsule Comments:   Reason for Stopping:         aspirin-acetaminophen-caffeine (EXCEDRIN ES) 250-250-65 mg per tablet Comments:   Reason for Stopping:               * Follow-up Care/Patient Instructions: Activity: Activity as tolerated  Diet: Regular Diet  Wound Care:  Ice to area for comfort    Follow-up Information     Follow up With Specialties Details Why Contact Info    Breastfeeding Support Group Lactation Services   Meets 1st And 3rd Tuesday Each Month From 10:00-11:00  [id number] [address]  ([address])  [place] [id number]    Postpartum Support Group    Meets 1st And 3rd Saturday Each Month From 09:00-10:30  [address] [facility]     None    None (395) Patient stated that they have no PCP      Mylene Prater MD Gynecology, Obstetrics In 2 weeks  [address] [facility] [phone]             Signed By:  Iban Gonzales DO     March 2, 2019

## 2019-03-02 NOTE — PROGRESS NOTES
Post-Partum Day Number 2 Progress Note Patient doing well post-partum without significant complaints. Voiding without difficulty, normal lochia. Some difficulty with breastfeeding. Vitals:   
Patient Vitals for the past 24 hrs: 
 BP Temp Pulse Resp  
19 0832 124/86 97.6 °F (36.4 °C) (!) 103 14  
19 0545 130/89 97.9 °F (36.6 °C) 89 16  
19 2145 131/85 98.1 °F (36.7 °C) 91 16  
19 1725 120/72 98 °F (36.7 °C) (!) 102 14  
19 1005 118/76 97.8 °F (36.6 °C) (!) 104 14 Temp (24hrs), Av.9 °F (36.6 °C), Min:97.6 °F (36.4 °C), Max:98.1 °F (36.7 °C) Vital signs stable, afebrile. Exam:  Patient without distress. Abdomen soft, fundus firm, nontender Lower extremities- nontender with trace edema; no cords Assessment and Plan:  Patient appears to be having uncomplicated post-partum course. Continue to work with lactation for breastfeeding support. A+/RI/female infant. Follow up in 2 weeks for routine PP checkup. Signed By: Aida Aguirre DO 2019

## 2019-03-02 NOTE — ROUTINE PROCESS
Bedside shift change report given to RYLAND Salcedo RN (oncoming nurse) by Monet Mcgregor RN (offgoing nurse). Report included the following information SBAR, MAR and Recent Results.

## 2021-01-24 ENCOUNTER — HOSPITAL ENCOUNTER (EMERGENCY)
Age: 31
Discharge: HOME | End: 2021-01-24
Attending: EMERGENCY MEDICINE
Payer: COMMERCIAL

## 2021-01-24 ENCOUNTER — APPOINTMENT (OUTPATIENT)
Dept: ULTRASOUND IMAGING | Age: 31
End: 2021-01-24
Attending: EMERGENCY MEDICINE
Payer: COMMERCIAL

## 2021-01-24 ENCOUNTER — APPOINTMENT (OUTPATIENT)
Dept: CT IMAGING | Age: 31
End: 2021-01-24
Attending: EMERGENCY MEDICINE
Payer: COMMERCIAL

## 2021-01-24 VITALS
SYSTOLIC BLOOD PRESSURE: 160 MMHG | WEIGHT: 140 LBS | HEIGHT: 61 IN | TEMPERATURE: 97.8 F | DIASTOLIC BLOOD PRESSURE: 95 MMHG | BODY MASS INDEX: 26.43 KG/M2 | HEART RATE: 96 BPM | OXYGEN SATURATION: 98 % | RESPIRATION RATE: 18 BRPM

## 2021-01-24 DIAGNOSIS — N83.201 CYST OF RIGHT OVARY: Primary | ICD-10-CM

## 2021-01-24 DIAGNOSIS — K29.00 ACUTE GASTRITIS WITHOUT HEMORRHAGE, UNSPECIFIED GASTRITIS TYPE: ICD-10-CM

## 2021-01-24 LAB
ALBUMIN SERPL-MCNC: 3.8 G/DL (ref 3.5–5)
ALBUMIN/GLOB SERPL: 1.1 {RATIO} (ref 1.1–2.2)
ALP SERPL-CCNC: 56 U/L (ref 45–117)
ALT SERPL-CCNC: 16 U/L (ref 12–78)
ANION GAP SERPL CALC-SCNC: 4 MMOL/L (ref 5–15)
APPEARANCE UR: CLEAR
AST SERPL-CCNC: 9 U/L (ref 15–37)
BACTERIA URNS QL MICRO: NEGATIVE /HPF
BASOPHILS # BLD: 0 K/UL (ref 0–0.1)
BASOPHILS NFR BLD: 0 % (ref 0–1)
BILIRUB SERPL-MCNC: 0.3 MG/DL (ref 0.2–1)
BILIRUB UR QL: NEGATIVE
BUN SERPL-MCNC: 9 MG/DL (ref 6–20)
BUN/CREAT SERPL: 14 (ref 12–20)
CALCIUM SERPL-MCNC: 9 MG/DL (ref 8.5–10.1)
CHLORIDE SERPL-SCNC: 109 MMOL/L (ref 97–108)
CO2 SERPL-SCNC: 28 MMOL/L (ref 21–32)
COLOR UR: NORMAL
CREAT SERPL-MCNC: 0.64 MG/DL (ref 0.55–1.02)
DIFFERENTIAL METHOD BLD: ABNORMAL
EOSINOPHIL # BLD: 0.1 K/UL (ref 0–0.4)
EOSINOPHIL NFR BLD: 1 % (ref 0–7)
EPITH CASTS URNS QL MICRO: NORMAL /LPF
ERYTHROCYTE [DISTWIDTH] IN BLOOD BY AUTOMATED COUNT: 11.3 % (ref 11.5–14.5)
GLOBULIN SER CALC-MCNC: 3.6 G/DL (ref 2–4)
GLUCOSE SERPL-MCNC: 89 MG/DL (ref 65–100)
GLUCOSE UR STRIP.AUTO-MCNC: NEGATIVE MG/DL
HCG SERPL QL: NEGATIVE
HCT VFR BLD AUTO: 41.9 % (ref 35–47)
HGB BLD-MCNC: 14.4 G/DL (ref 11.5–16)
HGB UR QL STRIP: NEGATIVE
HYALINE CASTS URNS QL MICRO: NORMAL /LPF (ref 0–5)
IMM GRANULOCYTES # BLD AUTO: 0 K/UL (ref 0–0.04)
IMM GRANULOCYTES NFR BLD AUTO: 0 % (ref 0–0.5)
KETONES UR QL STRIP.AUTO: NEGATIVE MG/DL
LEUKOCYTE ESTERASE UR QL STRIP.AUTO: NEGATIVE
LIPASE SERPL-CCNC: 127 U/L (ref 73–393)
LYMPHOCYTES # BLD: 2.6 K/UL (ref 0.8–3.5)
LYMPHOCYTES NFR BLD: 28 % (ref 12–49)
MAGNESIUM SERPL-MCNC: 2 MG/DL (ref 1.6–2.4)
MCH RBC QN AUTO: 32.3 PG (ref 26–34)
MCHC RBC AUTO-ENTMCNC: 34.4 G/DL (ref 30–36.5)
MCV RBC AUTO: 93.9 FL (ref 80–99)
MONOCYTES # BLD: 0.6 K/UL (ref 0–1)
MONOCYTES NFR BLD: 6 % (ref 5–13)
NEUTS SEG # BLD: 5.9 K/UL (ref 1.8–8)
NEUTS SEG NFR BLD: 65 % (ref 32–75)
NITRITE UR QL STRIP.AUTO: NEGATIVE
NRBC # BLD: 0 K/UL (ref 0–0.01)
NRBC BLD-RTO: 0 PER 100 WBC
PH UR STRIP: 6.5 [PH] (ref 5–8)
PLATELET # BLD AUTO: 227 K/UL (ref 150–400)
PMV BLD AUTO: 10.6 FL (ref 8.9–12.9)
POTASSIUM SERPL-SCNC: 3.6 MMOL/L (ref 3.5–5.1)
PROT SERPL-MCNC: 7.4 G/DL (ref 6.4–8.2)
PROT UR STRIP-MCNC: NEGATIVE MG/DL
RBC # BLD AUTO: 4.46 M/UL (ref 3.8–5.2)
RBC #/AREA URNS HPF: NORMAL /HPF (ref 0–5)
SODIUM SERPL-SCNC: 141 MMOL/L (ref 136–145)
SP GR UR REFRACTOMETRY: 1.02 (ref 1–1.03)
UR CULT HOLD, URHOLD: NORMAL
UROBILINOGEN UR QL STRIP.AUTO: 1 EU/DL (ref 0.2–1)
WBC # BLD AUTO: 9.1 K/UL (ref 3.6–11)
WBC URNS QL MICRO: NORMAL /HPF (ref 0–4)

## 2021-01-24 PROCEDURE — 36415 COLL VENOUS BLD VENIPUNCTURE: CPT

## 2021-01-24 PROCEDURE — 84703 CHORIONIC GONADOTROPIN ASSAY: CPT

## 2021-01-24 PROCEDURE — 76705 ECHO EXAM OF ABDOMEN: CPT

## 2021-01-24 PROCEDURE — 96360 HYDRATION IV INFUSION INIT: CPT

## 2021-01-24 PROCEDURE — 83735 ASSAY OF MAGNESIUM: CPT

## 2021-01-24 PROCEDURE — 99284 EMERGENCY DEPT VISIT MOD MDM: CPT

## 2021-01-24 PROCEDURE — 74011000636 HC RX REV CODE- 636: Performed by: EMERGENCY MEDICINE

## 2021-01-24 PROCEDURE — 83690 ASSAY OF LIPASE: CPT

## 2021-01-24 PROCEDURE — 85025 COMPLETE CBC W/AUTO DIFF WBC: CPT

## 2021-01-24 PROCEDURE — 81001 URINALYSIS AUTO W/SCOPE: CPT

## 2021-01-24 PROCEDURE — 74011250636 HC RX REV CODE- 250/636: Performed by: EMERGENCY MEDICINE

## 2021-01-24 PROCEDURE — 80053 COMPREHEN METABOLIC PANEL: CPT

## 2021-01-24 PROCEDURE — 74177 CT ABD & PELVIS W/CONTRAST: CPT

## 2021-01-24 PROCEDURE — 96361 HYDRATE IV INFUSION ADD-ON: CPT

## 2021-01-24 RX ORDER — SUCRALFATE 1 G/1
1 TABLET ORAL 3 TIMES DAILY
Qty: 30 TAB | Refills: 0 | Status: SHIPPED | OUTPATIENT
Start: 2021-01-24 | End: 2021-02-03

## 2021-01-24 RX ORDER — ONDANSETRON 4 MG/1
4 TABLET, ORALLY DISINTEGRATING ORAL
Qty: 20 TAB | Refills: 0 | Status: SHIPPED | OUTPATIENT
Start: 2021-01-24 | End: 2021-10-11 | Stop reason: ALTCHOICE

## 2021-01-24 RX ORDER — FAMOTIDINE 20 MG/1
20 TABLET, FILM COATED ORAL 2 TIMES DAILY
Qty: 60 TAB | Refills: 0 | Status: SHIPPED | OUTPATIENT
Start: 2021-01-24 | End: 2021-02-23

## 2021-01-24 RX ORDER — OMEPRAZOLE 20 MG/1
20 CAPSULE, DELAYED RELEASE ORAL DAILY
Qty: 15 CAP | Refills: 2 | Status: SHIPPED | OUTPATIENT
Start: 2021-01-24 | End: 2021-02-08

## 2021-01-24 RX ADMIN — SODIUM CHLORIDE 1000 ML: 9 INJECTION, SOLUTION INTRAVENOUS at 16:07

## 2021-01-24 RX ADMIN — IOPAMIDOL 100 ML: 755 INJECTION, SOLUTION INTRAVENOUS at 17:54

## 2021-01-24 NOTE — ED TRIAGE NOTES
Pt reports RUQ abdominal pain that radiates through to back, nausea, loss of appetite, diarrhea, bloating for a few days. Also has urinary frequency, dysuria, urgency.

## 2021-01-24 NOTE — DISCHARGE INSTRUCTIONS
Please have your family practitioner or gynecologist sound of the pelvis after a few menstrual cycles in order to reevaluate the ovarian cyst.  Return to the emergency department for any new or worsening symptoms.

## 2021-01-24 NOTE — ED PROVIDER NOTES
Patient is a 59-year-old female with no significant past medical history other than recurrent UTIs who presents emergency department with several days of right-sided abdominal pain, nausea, vomiting, diarrhea that is been nonbloody and the feeling of being bloated. She states that she has a sister and mother who had the gallbladders out and she feels that she has similar symptoms. She notes she has had chills but no fevers. She states food seems to make her symptoms worse. She also notes some dysuria and frequency consistent with prior urinary tract infections. Denies any abnormal vaginal discharge or any concern for exposure to STI. No past medical history on file. No past surgical history on file. No family history on file.     Social History     Socioeconomic History    Marital status: SINGLE     Spouse name: Not on file    Number of children: Not on file    Years of education: Not on file    Highest education level: Not on file   Occupational History    Not on file   Social Needs    Financial resource strain: Not on file    Food insecurity     Worry: Not on file     Inability: Not on file    Transportation needs     Medical: Not on file     Non-medical: Not on file   Tobacco Use    Smoking status: Never Smoker    Smokeless tobacco: Never Used   Substance and Sexual Activity    Alcohol use: No    Drug use: No    Sexual activity: Not on file   Lifestyle    Physical activity     Days per week: Not on file     Minutes per session: Not on file    Stress: Not on file   Relationships    Social connections     Talks on phone: Not on file     Gets together: Not on file     Attends Denominational service: Not on file     Active member of club or organization: Not on file     Attends meetings of clubs or organizations: Not on file     Relationship status: Not on file    Intimate partner violence     Fear of current or ex partner: Not on file     Emotionally abused: Not on file Physically abused: Not on file     Forced sexual activity: Not on file   Other Topics Concern     Service Not Asked    Blood Transfusions Not Asked    Caffeine Concern Not Asked    Occupational Exposure Not Asked    Hobby Hazards Not Asked    Sleep Concern Not Asked    Stress Concern Not Asked    Weight Concern Not Asked    Special Diet Not Asked    Back Care Not Asked    Exercise Not Asked    Bike Helmet Not Asked   [address] Not Asked    Self-Exams Not Asked   Social History Narrative    Not on file         ALLERGIES: Patient has no known allergies. Review of Systems   Constitutional: Positive for appetite change and chills. Negative for fatigue. HENT: Negative for drooling. Eyes: Negative for photophobia. Respiratory: Negative for shortness of breath. Cardiovascular: Negative for chest pain. Gastrointestinal: Positive for abdominal distention, abdominal pain, diarrhea, nausea and vomiting. Negative for blood in stool. Genitourinary: Positive for dysuria and frequency. Musculoskeletal: Negative for back pain and neck pain. Skin: Negative for rash. Neurological: Negative for seizures, syncope and headaches. Hematological: Does not bruise/bleed easily. Psychiatric/Behavioral: Negative. Vitals:    01/24/21 1418   BP: (!) 160/95   Pulse: 96   Resp: 18   Temp: 97.8 °F (36.6 °C)   SpO2: 98%   Weight: 63.5 kg (140 lb)   Height: 5' 1\" (1.549 m)            Physical Exam  Vitals signs and nursing note reviewed. Constitutional:       Appearance: She is well-developed. She is not toxic-appearing or diaphoretic. HENT:      Head: Normocephalic and atraumatic. Mouth/Throat:      Comments: Mask in place  Eyes:      General: No scleral icterus. Cardiovascular:      Rate and Rhythm: Normal rate and regular rhythm. Heart sounds: Normal heart sounds. Pulmonary:      Effort: Pulmonary effort is normal.      Breath sounds: Normal breath sounds. No stridor. Abdominal:      General: Abdomen is flat. Bowel sounds are normal.      Palpations: Abdomen is soft. Tenderness: There is abdominal tenderness in the right upper quadrant, right lower quadrant and periumbilical area. There is no guarding or rebound. Skin:     General: Skin is warm. Neurological:      Mental Status: She is alert and oriented to person, place, and time.    Psychiatric:         Mood and Affect: Mood normal.         Behavior: Behavior normal.          MDM  Number of Diagnoses or Management Options  Acute gastritis without hemorrhage, unspecified gastritis type: new and requires workup  Cyst of right ovary: new and requires workup     Amount and/or Complexity of Data Reviewed  Clinical lab tests: reviewed and ordered  Tests in the radiology section of CPT®: ordered and reviewed    Patient Progress  Patient progress: stable         Procedures

## 2021-10-11 ENCOUNTER — OFFICE VISIT (OUTPATIENT)
Dept: FAMILY MEDICINE CLINIC | Age: 31
End: 2021-10-11
Payer: COMMERCIAL

## 2021-10-11 VITALS
WEIGHT: 131 LBS | HEART RATE: 74 BPM | TEMPERATURE: 98.3 F | RESPIRATION RATE: 18 BRPM | BODY MASS INDEX: 24.73 KG/M2 | OXYGEN SATURATION: 97 % | SYSTOLIC BLOOD PRESSURE: 97 MMHG | DIASTOLIC BLOOD PRESSURE: 62 MMHG | HEIGHT: 61 IN

## 2021-10-11 DIAGNOSIS — Z00.00 WELL ADULT EXAM: Primary | ICD-10-CM

## 2021-10-11 DIAGNOSIS — E16.2 HYPOGLYCEMIA: ICD-10-CM

## 2021-10-11 DIAGNOSIS — G43.109 MIGRAINE WITH AURA AND WITHOUT STATUS MIGRAINOSUS, NOT INTRACTABLE: ICD-10-CM

## 2021-10-11 PROCEDURE — 99385 PREV VISIT NEW AGE 18-39: CPT | Performed by: NURSE PRACTITIONER

## 2021-10-11 RX ORDER — RIZATRIPTAN BENZOATE 10 MG/1
10 TABLET, ORALLY DISINTEGRATING ORAL
Qty: 12 TABLET | Refills: 5 | Status: SHIPPED | OUTPATIENT
Start: 2021-10-11 | End: 2021-10-11

## 2021-10-11 NOTE — PROGRESS NOTES
Subjective:   32 y.o. female for Well Woman Check. Her gyne and breast care is done elsewhere by her Ob-Gyne physician. Patient Active Problem List    Diagnosis Date Noted    Oligohydramnios 02/27/2019     Current Outpatient Medications   Medication Sig Dispense Refill    rizatriptan (MAXALT-MLT) 10 mg disintegrating tablet Take 1 Tablet by mouth once as needed for Migraine for up to 1 dose. 15 Tablet 5     Family History   Problem Relation Age of Onset    Diabetes Mother     Hypertension Mother     Diabetes Father     Hypertension Father      Social History     Tobacco Use    Smoking status: Never Smoker    Smokeless tobacco: Never Used   Substance Use Topics    Alcohol use: No             ROS: Feeling generally well. No TIA's or unusual headaches, no dysphagia. No prolonged cough. No dyspnea or chest pain on exertion. No abdominal pain, change in bowel habits, black or bloody stools. No urinary tract symptoms. No new or unusual musculoskeletal symptoms. Specific concerns today: new patient to the office, having several migraines a week, never been on migraine meds before, excedrin only otc; does have quite a bit of stress. Objective: The patient appears well, alert, oriented x 3, in no distress. Visit Vitals  BP 97/62 (BP 1 Location: Left upper arm, BP Patient Position: Sitting)   Pulse 74   Temp 98.3 °F (36.8 °C) (Oral)   Resp 18   Ht 5' 1\" (1.549 m)   Wt 131 lb (59.4 kg)   LMP 09/29/2021   SpO2 97%   BMI 24.75 kg/m²     ENT normal.  Neck supple. No adenopathy or thyromegaly. LOUIE. Lungs are clear, good air entry, no wheezes, rhonchi or rales. S1 and S2 normal, no murmurs, regular rate and rhythm. Abdomen soft without tenderness, guarding, mass or organomegaly. Extremities show no edema, normal peripheral pulses. Neurological is normal, no focal findings. Breast and Pelvic exams are deferred.     Assessment/Plan:   Well Woman  lose weight, increase physical activity, follow low fat diet, follow low salt diet, routine labs ordered  Encounter Diagnoses   Name Primary?  Well adult exam Yes    Hypoglycemia     Migraine with aura and without status migrainosus, not intractable      Orders Placed This Encounter    CBC WITH AUTOMATED DIFF    METABOLIC PANEL, COMPREHENSIVE    TSH 3RD GENERATION    LIPID PANEL    HEMOGLOBIN A1C WITH EAG    REFERRAL TO NEUROLOGY    rizatriptan (MAXALT-MLT) 10 mg disintegrating tablet   labs updated today  Start maxalt mlt for migraines, reviewed how to use  Referral to Neuro as well per patient request    I have discussed the diagnosis with the patient and the intended plan as seen in the above orders. The patient has received an after-visit summary and questions were answered concerning future plans. Patient conveyed understanding of the plan at the time of the visit.     Sarah Meyer, MSN, ANP  10/11/2021

## 2021-10-11 NOTE — PROGRESS NOTES
Chief Complaint   Patient presents with    Establish Care     Pt being seen to establish care  -pt states she wants to make sure she is not diabetic  -pt wants to discuss migraines  -pt reports she has been having them since she was younger  -pt reports that the last episode was a few days ago    1. Have you been to the ER, urgent care clinic since your last visit? Hospitalized since your last visit? No    2. Have you seen or consulted any other health care providers outside of the [address] [place] [address] since your last visit? Include any pap smears or colon screening.  No     Pt has no other concerns

## 2021-10-12 LAB
ALBUMIN SERPL-MCNC: 4.6 G/DL (ref 3.8–4.8)
ALBUMIN/GLOB SERPL: 2 {RATIO} (ref 1.2–2.2)
ALP SERPL-CCNC: 69 IU/L (ref 44–121)
ALT SERPL-CCNC: 13 IU/L (ref 0–32)
AST SERPL-CCNC: 12 IU/L (ref 0–40)
BASOPHILS # BLD AUTO: 0 X10E3/UL (ref 0–0.2)
BASOPHILS NFR BLD AUTO: 0 %
BILIRUB SERPL-MCNC: 0.5 MG/DL (ref 0–1.2)
BUN SERPL-MCNC: 11 MG/DL (ref 6–20)
BUN/CREAT SERPL: 18 (ref 9–23)
CALCIUM SERPL-MCNC: 9.3 MG/DL (ref 8.7–10.2)
CHLORIDE SERPL-SCNC: 105 MMOL/L (ref 96–106)
CHOLEST SERPL-MCNC: 179 MG/DL (ref 100–199)
CO2 SERPL-SCNC: 27 MMOL/L (ref 20–29)
CREAT SERPL-MCNC: 0.61 MG/DL (ref 0.57–1)
EOSINOPHIL # BLD AUTO: 0.2 X10E3/UL (ref 0–0.4)
EOSINOPHIL NFR BLD AUTO: 3 %
ERYTHROCYTE [DISTWIDTH] IN BLOOD BY AUTOMATED COUNT: 11.5 % (ref 11.7–15.4)
EST. AVERAGE GLUCOSE BLD GHB EST-MCNC: 97 MG/DL
GLOBULIN SER CALC-MCNC: 2.3 G/DL (ref 1.5–4.5)
GLUCOSE SERPL-MCNC: 86 MG/DL (ref 65–99)
HBA1C MFR BLD: 5 % (ref 4.8–5.6)
HCT VFR BLD AUTO: 41.2 % (ref 34–46.6)
HDLC SERPL-MCNC: 47 MG/DL
HGB BLD-MCNC: 14.4 G/DL (ref 11.1–15.9)
IMM GRANULOCYTES # BLD AUTO: 0 X10E3/UL (ref 0–0.1)
IMM GRANULOCYTES NFR BLD AUTO: 0 %
IMP & REVIEW OF LAB RESULTS: NORMAL
LDLC SERPL CALC-MCNC: 120 MG/DL (ref 0–99)
LYMPHOCYTES # BLD AUTO: 2.4 X10E3/UL (ref 0.7–3.1)
LYMPHOCYTES NFR BLD AUTO: 38 %
MCH RBC QN AUTO: 33.4 PG (ref 26.6–33)
MCHC RBC AUTO-ENTMCNC: 35 G/DL (ref 31.5–35.7)
MCV RBC AUTO: 96 FL (ref 79–97)
MONOCYTES # BLD AUTO: 0.4 X10E3/UL (ref 0.1–0.9)
MONOCYTES NFR BLD AUTO: 7 %
NEUTROPHILS # BLD AUTO: 3.2 X10E3/UL (ref 1.4–7)
NEUTROPHILS NFR BLD AUTO: 52 %
PLATELET # BLD AUTO: 215 X10E3/UL (ref 150–450)
POTASSIUM SERPL-SCNC: 4.4 MMOL/L (ref 3.5–5.2)
PROT SERPL-MCNC: 6.9 G/DL (ref 6–8.5)
RBC # BLD AUTO: 4.31 X10E6/UL (ref 3.77–5.28)
SODIUM SERPL-SCNC: 142 MMOL/L (ref 134–144)
TRIGL SERPL-MCNC: 64 MG/DL (ref 0–149)
TSH SERPL DL<=0.005 MIU/L-ACNC: 0.82 UIU/ML (ref 0.45–4.5)
VLDLC SERPL CALC-MCNC: 12 MG/DL (ref 5–40)
WBC # BLD AUTO: 6.2 X10E3/UL (ref 3.4–10.8)

## 2021-10-15 ENCOUNTER — TELEPHONE (OUTPATIENT)
Dept: FAMILY MEDICINE CLINIC | Age: 31
End: 2021-10-15

## 2021-10-15 NOTE — PROGRESS NOTES
RECOMMENDATIONS:  None. Keep up the good work! Your labs are in good range for iron, cholesterol and thyroid. You are also negative for diabetes. Work on diet and exercise. Recheck this test: 12 months.

## 2021-11-26 LAB — SARS-COV-2, NAA: NOT DETECTED

## 2021-12-01 ENCOUNTER — OFFICE VISIT (OUTPATIENT)
Dept: NEUROLOGY | Age: 31
End: 2021-12-01
Payer: COMMERCIAL

## 2021-12-01 VITALS
RESPIRATION RATE: 14 BRPM | OXYGEN SATURATION: 98 % | SYSTOLIC BLOOD PRESSURE: 112 MMHG | WEIGHT: 126 LBS | HEART RATE: 110 BPM | HEIGHT: 61 IN | DIASTOLIC BLOOD PRESSURE: 76 MMHG | BODY MASS INDEX: 23.79 KG/M2 | TEMPERATURE: 98.2 F

## 2021-12-01 DIAGNOSIS — R51.9 MIXED HEADACHE: Primary | ICD-10-CM

## 2021-12-01 PROCEDURE — 99204 OFFICE O/P NEW MOD 45 MIN: CPT | Performed by: SPECIALIST

## 2021-12-01 RX ORDER — RIZATRIPTAN BENZOATE 10 MG/1
TABLET, ORALLY DISINTEGRATING ORAL
COMMUNITY
Start: 2021-10-11 | End: 2022-08-28

## 2021-12-01 NOTE — PROGRESS NOTES
Neurology Consult      Subjective:      Mile Marinelli is a 32 y.o. female who comes in today with a headache story that she says goes back to the 6 grade. Averages normally 6 or 7 headaches a month although in retrospect headaches have trended slightly more frequent in recent months. Does say stress can be a factor at times and she is working on it. The onset of her typical headaches is fast and can peak in about 1 hour or slightly less. The initial manifestation is sparkles or similar shimmery phenomena in both eyes, that can last mere seconds and then gives way to a generalized pulsatile headache and random superimposed sharp scalp pain. That can be nausea and nonprojectile vomiting more often than not. It can last for days. Intensified by light noise and can get it with anorexia and lack of sleep qualities. Diminished by being in a cool room dark environment. Family history is positive for her brother mother and maternal grand dad with headaches. Treatment on her part has included Excedrin which has only + or minus effective success rate. Sleep has been diminished and she is working on it. Stress is a factor and again she is working on it. Monthly cycles okay and she always has a headache that coincides with her menstrual cycle. The headaches are otherwise the same as in other timeframes. Testing to date for the headaches is none. She really does not exercise that much but she is considering it. Recent labs present in the medical section included normal TSH hemoglobin A1c CMP and CBC with differential.  With her number of headaches that she gets- went over the concept a preventative drug strategy beyond acute rescue. If successful cuts down on the number intensity and duration of headaches and ensures that the rescue drug is more likely to work and perhaps even less often to take.          Current Outpatient Medications   Medication Sig Dispense Refill    rizatriptan (MAXALT-MLT) 10 mg disintegrating tablet  (Patient not taking: Reported on 12/1/2021)        No Known Allergies  Past Medical History:   Diagnosis Date    Headache       No past surgical history on file. Social History     Socioeconomic History    Marital status: SINGLE     Spouse name: Not on file    Number of children: Not on file    Years of education: Not on file    Highest education level: Not on file   Occupational History    Not on file   Tobacco Use    Smoking status: Never Smoker    Smokeless tobacco: Never Used   Substance and Sexual Activity    Alcohol use: No    Drug use: Yes     Types: Marijuana    Sexual activity: Yes   Other Topics Concern     Service Not Asked    Blood Transfusions Not Asked    Caffeine Concern Not Asked    Occupational Exposure Not Asked    Hobby Hazards Not Asked    Sleep Concern Not Asked    Stress Concern Not Asked    Weight Concern Not Asked    Special Diet Not Asked    Back Care Not Asked    Exercise Not Asked    Bike Helmet Not Asked    Seat Belt Not Asked    Self-Exams Not Asked   Social History Narrative    Not on file     Social Determinants of Health     Financial Resource Strain:     Difficulty of Paying Living Expenses: Not on file   Food Insecurity:     Worried About Running Out of Food in the Last Year: Not on file    [place] of Food in the Last Year: Not on file   Transportation Needs:     Lack of Transportation (Medical): Not on file    Lack of Transportation (Non-Medical):  Not on file   Physical Activity:     Days of Exercise per Week: Not on file    Minutes of Exercise per Session: Not on file   Stress:     Feeling of Stress : Not on file   Social Connections:     Frequency of Communication with Friends and Family: Not on file    Frequency of Social Gatherings with Friends and Family: Not on file    Attends Faith Services: Not on file    Active Member of Clubs or Organizations: Not on file    Attends Club or Organization Meetings: Not on file    Marital Status: Not on file   Intimate Partner Violence:     Fear of Current or Ex-Partner: Not on file    Emotionally Abused: Not on file    Physically Abused: Not on file    Sexually Abused: Not on file   Housing Stability:     Unable to Pay for Housing in the Last Year: Not on file    Number of [place] in the Last Year: Not on file    Unstable Housing in the Last Year: Not on file      Family History   Problem Relation Age of Onset    Diabetes Mother     Hypertension Mother     Diabetes Father     Hypertension Father       Visit Vitals  /76 (BP 1 Location: Left upper arm, BP Patient Position: Sitting)   Pulse (!) 110   Temp 98.2 °F (36.8 °C) (Temporal)   Resp 14   Ht 5' 1\" (1.549 m)   Wt 57.2 kg (126 lb)   LMP 11/15/2021 (Approximate)   SpO2 98%   Breastfeeding No   BMI 23.81 kg/m²        Review of Systems:   A comprehensive review of systems was negative except for that written in the HPI. Neuro Exam:     Appearance: The patient is well developed, well nourished, provides a coherent history and is in no acute distress. Mental Status: Oriented to time, place and person. Mood and affect appropriate. Cranial Nerves:   Intact visual fields. Fundi are benign. LOUIE, EOM's full, no nystagmus, no ptosis. Facial sensation is normal. Corneal reflexes are intact. Facial movement is symmetric. Hearing is normal bilaterally. Palate is midline with normal sternocleidomastoid and trapezius muscles are normal. Tongue is midline. Motor:  5/5 strength in upper and lower proximal and distal muscles. Normal bulk and tone. No fasciculations. Reflexes:   Deep tendon reflexes 2+/4 and symmetrical.   Sensory:   Normal to touch, pinprick and vibration. Gait:  Normal gait. Tremor:   No tremor noted. Cerebellar:  No cerebellar signs present. Neurovascular:  Normal heart sounds and regular rhythm, peripheral pulses intact, and no carotid bruits.             Assessment:   Mixed headaches. Do appreciate the persistence and change of quality of headaches and the rate limiting effect they have on her quality of life etc.  We will go ahead and get a baseline brain MRI as she is never had any imaging of her head for headaches since adolescence. Will suggest she give the Maxalt a legitimate try as a rescue drug and was reminded he can be taken at moment 0, with over-the-counter Aleve ibuprofen and similar over-the-counter possibilities, for potentially a better combo effect. As noted above went over the concept of preventative drug strategy and how that fits in for some people with their headache story. Plan:   Revisit in 6 to 8 weeks.   Signed by :  Shashank López MD

## 2021-12-01 NOTE — PATIENT INSTRUCTIONS
Patient history viewed patient examined. I do respect the headache history I have obtained on this new patient and will order a brain MRI as a screening process to make sure this is a straightforward migraine type of headache story etc.  Has the Maxalt in place which I think is a fine rescue remedy for migraine-like headaches and we also took the time to note these drugs can be used with over-the-counter for potentially a better combination effect. Also took the time to talk about preventative drug strategy and what that means in terms of cutting down on the number intensity and duration of headaches and less reliant on rescue drugs as they are. Revisit schedule is congested so suggest revisit in about 6 to 8 weeks.

## 2021-12-01 NOTE — LETTER
12/1/2021    Patient: Shawn Child   YOB: 1990   Date of Visit: 12/1/2021     Cierra Self NP  [address] [facility] [id number]  [address] [facility] [address]    Dear Cierra Self NP,      Thank you for referring Ms. Shawn Kruger to [facility] for evaluation. My notes for this consultation are attached. If you have questions, please do not hesitate to call me. I look forward to following your patient along with you.       Sincerely,    Peace Guillen MD

## 2021-12-08 ENCOUNTER — HOSPITAL ENCOUNTER (OUTPATIENT)
Dept: MRI IMAGING | Age: 31
Discharge: HOME | End: 2021-12-08
Attending: SPECIALIST
Payer: COMMERCIAL

## 2021-12-08 DIAGNOSIS — R51.9 MIXED HEADACHE: ICD-10-CM

## 2021-12-08 PROCEDURE — 70551 MRI BRAIN STEM W/O DYE: CPT

## 2022-02-07 LAB
CHLAMYDIA, EXTERNAL: NEGATIVE
N. GONORRHEA, EXTERNAL: NEGATIVE

## 2022-02-14 LAB
ANTIBODY SCREEN, EXTERNAL: NEGATIVE
HBSAG, EXTERNAL: NEGATIVE
HIV, EXTERNAL: NEGATIVE
RPR, EXTERNAL: NON REACTIVE
RUBELLA, EXTERNAL: NORMAL
TYPE, ABO & RH, EXTERNAL: NORMAL

## 2022-02-15 ENCOUNTER — HOSPITAL ENCOUNTER (EMERGENCY)
Age: 32
Discharge: HOME | End: 2022-02-15
Attending: EMERGENCY MEDICINE
Payer: COMMERCIAL

## 2022-02-15 VITALS
HEIGHT: 61 IN | DIASTOLIC BLOOD PRESSURE: 94 MMHG | TEMPERATURE: 96.8 F | OXYGEN SATURATION: 99 % | BODY MASS INDEX: 23.79 KG/M2 | WEIGHT: 126 LBS | HEART RATE: 72 BPM | RESPIRATION RATE: 18 BRPM | SYSTOLIC BLOOD PRESSURE: 132 MMHG

## 2022-02-15 DIAGNOSIS — O21.0 HYPEREMESIS GRAVIDARUM: Primary | ICD-10-CM

## 2022-02-15 LAB
ALBUMIN SERPL-MCNC: 4.2 G/DL (ref 3.5–5)
ALBUMIN/GLOB SERPL: 1.1 {RATIO} (ref 1.1–2.2)
ALP SERPL-CCNC: 59 U/L (ref 45–117)
ALT SERPL-CCNC: 20 U/L (ref 12–78)
ANION GAP SERPL CALC-SCNC: 12 MMOL/L (ref 5–15)
APPEARANCE UR: CLEAR
AST SERPL-CCNC: 10 U/L (ref 15–37)
BACTERIA URNS QL MICRO: ABNORMAL /HPF
BASOPHILS # BLD: 0 K/UL (ref 0–0.1)
BASOPHILS NFR BLD: 0 % (ref 0–1)
BILIRUB SERPL-MCNC: 0.6 MG/DL (ref 0.2–1)
BILIRUB UR QL: NEGATIVE
BUN SERPL-MCNC: 8 MG/DL (ref 6–20)
BUN/CREAT SERPL: 13 (ref 12–20)
CALCIUM SERPL-MCNC: 9.7 MG/DL (ref 8.5–10.1)
CHLORIDE SERPL-SCNC: 104 MMOL/L (ref 97–108)
CO2 SERPL-SCNC: 22 MMOL/L (ref 21–32)
COLOR UR: ABNORMAL
CREAT SERPL-MCNC: 0.62 MG/DL (ref 0.55–1.02)
DIFFERENTIAL METHOD BLD: ABNORMAL
EOSINOPHIL # BLD: 0 K/UL (ref 0–0.4)
EOSINOPHIL NFR BLD: 0 % (ref 0–7)
EPITH CASTS URNS QL MICRO: ABNORMAL /LPF
ERYTHROCYTE [DISTWIDTH] IN BLOOD BY AUTOMATED COUNT: 12.1 % (ref 11.5–14.5)
GLOBULIN SER CALC-MCNC: 3.8 G/DL (ref 2–4)
GLUCOSE SERPL-MCNC: 146 MG/DL (ref 65–100)
GLUCOSE UR STRIP.AUTO-MCNC: 100 MG/DL
HCT VFR BLD AUTO: 40.9 % (ref 35–47)
HGB BLD-MCNC: 14.5 G/DL (ref 11.5–16)
HGB UR QL STRIP: NEGATIVE
IMM GRANULOCYTES # BLD AUTO: 0.1 K/UL (ref 0–0.04)
IMM GRANULOCYTES NFR BLD AUTO: 1 % (ref 0–0.5)
KETONES UR QL STRIP.AUTO: >80 MG/DL
LEUKOCYTE ESTERASE UR QL STRIP.AUTO: NEGATIVE
LYMPHOCYTES # BLD: 1.4 K/UL (ref 0.8–3.5)
LYMPHOCYTES NFR BLD: 8 % (ref 12–49)
MAGNESIUM SERPL-MCNC: 1.7 MG/DL (ref 1.6–2.4)
MCH RBC QN AUTO: 33 PG (ref 26–34)
MCHC RBC AUTO-ENTMCNC: 35.5 G/DL (ref 30–36.5)
MCV RBC AUTO: 93 FL (ref 80–99)
MONOCYTES # BLD: 0.4 K/UL (ref 0–1)
MONOCYTES NFR BLD: 2 % (ref 5–13)
NEUTS SEG # BLD: 15.3 K/UL (ref 1.8–8)
NEUTS SEG NFR BLD: 89 % (ref 32–75)
NITRITE UR QL STRIP.AUTO: NEGATIVE
NRBC # BLD: 0 K/UL (ref 0–0.01)
NRBC BLD-RTO: 0 PER 100 WBC
PH UR STRIP: 6.5 [PH] (ref 5–8)
PLATELET # BLD AUTO: 317 K/UL (ref 150–400)
PMV BLD AUTO: 10.5 FL (ref 8.9–12.9)
POTASSIUM SERPL-SCNC: 3.7 MMOL/L (ref 3.5–5.1)
PROT SERPL-MCNC: 8 G/DL (ref 6.4–8.2)
PROT UR STRIP-MCNC: 30 MG/DL
RBC # BLD AUTO: 4.4 M/UL (ref 3.8–5.2)
RBC #/AREA URNS HPF: ABNORMAL /HPF (ref 0–5)
SODIUM SERPL-SCNC: 138 MMOL/L (ref 136–145)
SP GR UR REFRACTOMETRY: 1.03 (ref 1–1.03)
TSH SERPL DL<=0.05 MIU/L-ACNC: 0.31 UIU/ML (ref 0.36–3.74)
UR CULT HOLD, URHOLD: NORMAL
UROBILINOGEN UR QL STRIP.AUTO: 0.2 EU/DL (ref 0.2–1)
WBC # BLD AUTO: 17.2 K/UL (ref 3.6–11)
WBC URNS QL MICRO: ABNORMAL /HPF (ref 0–4)

## 2022-02-15 PROCEDURE — 85025 COMPLETE CBC W/AUTO DIFF WBC: CPT

## 2022-02-15 PROCEDURE — 96374 THER/PROPH/DIAG INJ IV PUSH: CPT

## 2022-02-15 PROCEDURE — 96361 HYDRATE IV INFUSION ADD-ON: CPT

## 2022-02-15 PROCEDURE — 99283 EMERGENCY DEPT VISIT LOW MDM: CPT

## 2022-02-15 PROCEDURE — 36415 COLL VENOUS BLD VENIPUNCTURE: CPT

## 2022-02-15 PROCEDURE — 74011250637 HC RX REV CODE- 250/637: Performed by: NURSE PRACTITIONER

## 2022-02-15 PROCEDURE — 74011250636 HC RX REV CODE- 250/636: Performed by: EMERGENCY MEDICINE

## 2022-02-15 PROCEDURE — 96376 TX/PRO/DX INJ SAME DRUG ADON: CPT

## 2022-02-15 PROCEDURE — 80053 COMPREHEN METABOLIC PANEL: CPT

## 2022-02-15 PROCEDURE — 74011250636 HC RX REV CODE- 250/636: Performed by: NURSE PRACTITIONER

## 2022-02-15 PROCEDURE — 84443 ASSAY THYROID STIM HORMONE: CPT

## 2022-02-15 PROCEDURE — 81001 URINALYSIS AUTO W/SCOPE: CPT

## 2022-02-15 PROCEDURE — 83735 ASSAY OF MAGNESIUM: CPT

## 2022-02-15 RX ORDER — ONDANSETRON 2 MG/ML
4 INJECTION INTRAMUSCULAR; INTRAVENOUS
Status: COMPLETED | OUTPATIENT
Start: 2022-02-15 | End: 2022-02-15

## 2022-02-15 RX ORDER — ONDANSETRON 8 MG/1
8 TABLET, ORALLY DISINTEGRATING ORAL
Qty: 20 TABLET | Refills: 0 | Status: SHIPPED
Start: 2022-02-15 | End: 2022-08-28

## 2022-02-15 RX ORDER — ONDANSETRON 2 MG/ML
8 INJECTION INTRAMUSCULAR; INTRAVENOUS
Status: COMPLETED | OUTPATIENT
Start: 2022-02-15 | End: 2022-02-15

## 2022-02-15 RX ORDER — ONDANSETRON 4 MG/1
4 TABLET, FILM COATED ORAL
Qty: 20 TABLET | Refills: 0 | Status: SHIPPED | OUTPATIENT
Start: 2022-02-15 | End: 2022-08-28

## 2022-02-15 RX ORDER — PROCHLORPERAZINE MALEATE 10 MG
10 TABLET ORAL
Qty: 25 TABLET | Refills: 0 | Status: SHIPPED | OUTPATIENT
Start: 2022-02-15 | End: 2022-02-23

## 2022-02-15 RX ORDER — PROCHLORPERAZINE MALEATE 5 MG
10 TABLET ORAL
Status: COMPLETED | OUTPATIENT
Start: 2022-02-15 | End: 2022-02-15

## 2022-02-15 RX ADMIN — SODIUM CHLORIDE 1000 ML: 9 INJECTION, SOLUTION INTRAVENOUS at 19:01

## 2022-02-15 RX ADMIN — PROCHLORPERAZINE MALEATE 10 MG: 5 TABLET ORAL at 21:30

## 2022-02-15 RX ADMIN — ONDANSETRON 4 MG: 2 INJECTION INTRAMUSCULAR; INTRAVENOUS at 19:41

## 2022-02-15 RX ADMIN — ONDANSETRON 8 MG: 2 INJECTION INTRAMUSCULAR; INTRAVENOUS at 17:11

## 2022-02-15 RX ADMIN — SODIUM CHLORIDE 1000 ML: 9 INJECTION, SOLUTION INTRAVENOUS at 17:09

## 2022-02-15 NOTE — ED TRIAGE NOTES
Patient arrives via EMS with c/o weakness, light-headness, N/V. Patient is 9 weeks pregnant with 2nd pregnancy. No complications with first pregnancy. States N/V present for past 3 weeks. Reports weakness present since this morning. BP: 146/76, HR: 70's, SpO2: 100% on RA, Blood sugar: 121.

## 2022-02-15 NOTE — ED PROVIDER NOTES
The history is provided by the patient. Vomiting   This is a new problem. The current episode started more than 1 week ago. The problem occurs 2 to 4 times per day. The problem has not changed since onset. The emesis has an appearance of stomach contents. There has been no fever. Pertinent negatives include no chills, no fever, no sweats, no abdominal pain, no diarrhea, no headaches, no arthralgias, no myalgias, no cough, no URI and no headaches. Her pertinent negatives include no irritable bowel syndrome, no inflammatory bowel disease, no short gut syndrome, no bowel resection, no recent abdominal surgery, no malabsorption, no gastric bypass and no DM. Past Medical History:   Diagnosis Date    Headache        No past surgical history on file.       Family History:   Problem Relation Age of Onset    Diabetes Mother     Hypertension Mother     Diabetes Father     Hypertension Father        Social History     Socioeconomic History    Marital status: SINGLE     Spouse name: Not on file    Number of children: Not on file    Years of education: Not on file    Highest education level: Not on file   Occupational History    Not on file   Tobacco Use    Smoking status: Never Smoker    Smokeless tobacco: Never Used   Substance and Sexual Activity    Alcohol use: No    Drug use: Yes     Types: Marijuana    Sexual activity: Yes   Other Topics Concern     Service Not Asked    Blood Transfusions Not Asked    Caffeine Concern Not Asked    Occupational Exposure Not Asked    Hobby Hazards Not Asked    Sleep Concern Not Asked    Stress Concern Not Asked    Weight Concern Not Asked    Special Diet Not Asked    Back Care Not Asked    Exercise Not Asked    Bike Helmet Not Asked   [address] Not Asked    Self-Exams Not Asked   Social History Narrative    Not on file     Social Determinants of Health     Financial Resource Strain:     Difficulty of Paying Living Expenses: Not on file   Food Insecurity:     Worried About Running Out of Food in the Last Year: Not on file    [place] of Food in the Last Year: Not on file   Transportation Needs:     Lack of Transportation (Medical): Not on file    Lack of Transportation (Non-Medical): Not on file   Physical Activity:     Days of Exercise per Week: Not on file    Minutes of Exercise per Session: Not on file   Stress:     Feeling of Stress : Not on file   Social Connections:     Frequency of Communication with Friends and Family: Not on file    Frequency of Social Gatherings with Friends and Family: Not on file    Attends Episcopal Services: Not on file    Active Member of [address] [place] [address] [employer] or Organizations: Not on file    Attends Club or Organization Meetings: Not on file    Marital Status: Not on file   Intimate Partner Violence:     Fear of Current or Ex-Partner: Not on file    Emotionally Abused: Not on file    Physically Abused: Not on file    Sexually Abused: Not on file   Housing Stability:     Unable to Pay for Housing in the Last Year: Not on file    Number of [place] in the Last Year: Not on file    Unstable Housing in the Last Year: Not on file         ALLERGIES: Patient has no known allergies. Review of Systems   Constitutional: Positive for fatigue. Negative for activity change, chills and fever. HENT: Negative for nosebleeds, sore throat, trouble swallowing and voice change. Eyes: Negative for visual disturbance. Respiratory: Negative for cough and shortness of breath. Cardiovascular: Negative for chest pain and palpitations. Gastrointestinal: Positive for nausea and vomiting. Negative for abdominal pain, constipation and diarrhea. Genitourinary: Negative for difficulty urinating, dysuria, hematuria and urgency. Musculoskeletal: Negative for arthralgias, back pain, myalgias, neck pain and neck stiffness. Skin: Negative for color change. Allergic/Immunologic: Negative for immunocompromised state. Neurological: Negative for dizziness, seizures, syncope, weakness, light-headedness, numbness and headaches. Psychiatric/Behavioral: Negative for behavioral problems, confusion, hallucinations, self-injury and suicidal ideas. Vitals:    02/15/22 1659   BP: (!) 132/94   Pulse: 72   Resp: 18   Temp: 96.8 °F (36 °C)   SpO2: 99%   Weight: 57.2 kg (126 lb)   Height: 5' 1\" (1.549 m)            Physical Exam  Vitals and nursing note reviewed. Constitutional:       General: She is not in acute distress. Appearance: She is well-developed. She is not diaphoretic. HENT:      Head: Atraumatic. Neck:      Trachea: No tracheal deviation. Cardiovascular:      Comments: Warm and well perfused  Pulmonary:      Effort: Pulmonary effort is normal. No respiratory distress. Musculoskeletal:         General: Normal range of motion. Skin:     General: Skin is warm and dry. Neurological:      Mental Status: She is alert. Coordination: Coordination normal.   Psychiatric:         Behavior: Behavior normal.         Thought Content: Thought content normal.         Judgment: Judgment normal.          MDM     This is a 49-year-old female, G2, P1 at approximately 9 weeks gestation, presenting with complaints of nausea and vomiting, generalized weakness. Patient states symptoms ongoing and worsening over the last 3 weeks. She states she was provided prescription for Unisom by her GYN. She denies vaginal discharge, leakage of fluids, vaginal bleeding, abdominal pain, fevers or chills. Physical exam is remarkable for a well-appearing young female, in no acute distress noted to be normotensive, afebrile without tachycardia, satting well on room air. Suspect hyperemesis gravidarum. Differential includes dehydration, electrolyte abnormality, acute kidney injury. Plan to provide IV fluids and antiemetics, obtain CMP, CBC, UA, mag, TSH. We will reassess, and make a disposition.     Procedures    Ketonuria, lab work without otherwise significant abnormality. Patient states symptoms improved, however not completely resolved. Offered admission for continued symptom management, patient deferring at this time. Will provide oral antiemetics, encourage GYN follow-up, return precautions given.

## 2022-03-20 PROBLEM — O41.00X0 OLIGOHYDRAMNIOS: Status: ACTIVE | Noted: 2019-02-27

## 2022-08-20 ENCOUNTER — HOSPITAL ENCOUNTER (EMERGENCY)
Age: 32
Discharge: HOME | End: 2022-08-21
Attending: STUDENT IN AN ORGANIZED HEALTH CARE EDUCATION/TRAINING PROGRAM | Admitting: OBSTETRICS & GYNECOLOGY
Payer: COMMERCIAL

## 2022-08-20 PROCEDURE — 74011250636 HC RX REV CODE- 250/636: Performed by: ADVANCED PRACTICE MIDWIFE

## 2022-08-20 PROCEDURE — 75810000275 HC EMERGENCY DEPT VISIT NO LEVEL OF CARE

## 2022-08-20 RX ORDER — ACETAMINOPHEN 500 MG
1000 TABLET ORAL ONCE
Status: COMPLETED | OUTPATIENT
Start: 2022-08-21 | End: 2022-08-21

## 2022-08-20 RX ADMIN — SODIUM CHLORIDE, POTASSIUM CHLORIDE, SODIUM LACTATE AND CALCIUM CHLORIDE 1000 ML: 600; 310; 30; 20 INJECTION, SOLUTION INTRAVENOUS at 23:10

## 2022-08-21 VITALS
TEMPERATURE: 98.1 F | HEIGHT: 61 IN | DIASTOLIC BLOOD PRESSURE: 59 MMHG | OXYGEN SATURATION: 98 % | WEIGHT: 166 LBS | SYSTOLIC BLOOD PRESSURE: 107 MMHG | HEART RATE: 76 BPM | RESPIRATION RATE: 18 BRPM | BODY MASS INDEX: 31.34 KG/M2

## 2022-08-21 PROCEDURE — 99282 EMERGENCY DEPT VISIT SF MDM: CPT

## 2022-08-21 PROCEDURE — 74011250637 HC RX REV CODE- 250/637: Performed by: OBSTETRICS & GYNECOLOGY

## 2022-08-21 PROCEDURE — 59025 FETAL NON-STRESS TEST: CPT

## 2022-08-21 PROCEDURE — 96360 HYDRATION IV INFUSION INIT: CPT

## 2022-08-21 RX ADMIN — ACETAMINOPHEN 1000 MG: 500 TABLET ORAL at 00:20

## 2022-08-21 NOTE — ED PROVIDER NOTES
NAVDEEP Evaluation    Name: Ellis Wesley MRN: [id number]  SSN: [id number]    YOB: 1990  Age: 28 y.o. Sex: female      Subjective:     Reason for Admission:  Pregnancy and cramping    History of Present Illness: Ellis Wesley is a 28 y.o.  female  with an estimated gestational age of 27w7d with Estimated Date of Delivery: 22. Patient complains of  cramping for several hours PTA. She rated it 8/10. Denied contractions, vaginal bleeding, decreased fetal movement, nausea, vomiting, headache, or blurred vision. Prenatal care has been uneventful. OB History          2    Para   1    Term   1            AB        Living   1         SAB        IAB        Ectopic        Molar        Multiple   0    Live Births   1              Past Medical History:   Diagnosis Date    Kidney stone 2020    Migraine with aura      No past surgical history on file. Social History     Occupational History    Not on file   Tobacco Use    Smoking status: Never    Smokeless tobacco: Never   Substance and Sexual Activity    Alcohol use: No    Drug use: Yes     Types: Marijuana    Sexual activity: Yes     Family History   Problem Relation Age of Onset    Diabetes Mother     Hypertension Mother     Diabetes Father     Hypertension Father        No Known Allergies  Prior to Admission medications    Medication Sig Start Date End Date Taking? Authorizing Provider   ondansetron hcl (Zofran) 4 mg tablet Take 1 Tablet by mouth every eight (8) hours as needed for Nausea. Patient not taking: Reported on 2022 2/15/22   Leeann Shell MD   ondansetron (ZOFRAN ODT) 8 mg disintegrating tablet Take 1 Tablet by mouth every eight (8) hours as needed for Nausea or Vomiting.   Patient not taking: Reported on 2022 2/15/22   Alcon Pandey NP   rizatriptan (MAXALT-MLT) 10 mg disintegrating tablet  10/11/21   Provider, Historical        Review of Systems- See HPI    Objective:     Vitals:    Vitals: 08/20/22 2305 08/20/22 2313   BP:  (!) 107/59   Pulse:  76   Resp:  18   SpO2:  98%   Weight: 75.3 kg (166 lb)    Height: 5' 1\" (1.549 m)       No data recorded. BP  Min: 107/59  Max: 107/59     Physical Exam    Cervical Exam: Closed/Thick/High  Uterine Activity: mild irritability  Membranes: Intact  Fetal Heart Rate: Reactive       Labs: No results found for this or any previous visit (from the past 24 hour(s)). Patient Active Problem List   Diagnosis Code    Oligohydramnios O41.00X0     Assessment and Plan:   Uterine irritability at 35 weeks 5 days. [place] contractions  No evidence of PTL. Iv hydration and discharge home after irritability improved. Acetaminophen 1000mg once. Pt sent home on labor precautions.       Signed By:  Raheem Parker MD     August 20, 2022

## 2022-08-21 NOTE — PROGRESS NOTES
2255: pt arrives to L&D c/o abdominal cramping that began appx 1 hour ago. Pt states she \"moved wrong\" and has been cramping since pain 8/10. Denies N/V, HA or blurry vision. Denies preg. Complications, no vaginal bleeding or LOF. Endorses fetal movement. 2303: pt applied to EFM and toco.  Pt is s/p treatment for Uti end of July, currently taking monistat for yeast infection. When aksed about PO hydration pt denies drinking enough. Pt asks \"can stress cause this? \" When inquired pt states she has a lot going on at home. States she feels safe. 2305: PIV placed, fluid bolus initiated. 2345: Dr Mckeon at the bedside. Pt now rates cramps 5/10. SVE performed with pt consent, closed and high. Md verbal order for 1g tylenol, after pt finishes IV bolus pt may go home. Pt agrees with plan, verbalizes understanding. 0017: EFM removed, reactive tracing. 2198: Pt given written and verbal discharge instructions. Pt denies concerns or questions. Ambulates off unit in stable condition.

## 2022-08-28 ENCOUNTER — HOSPITAL ENCOUNTER (EMERGENCY)
Age: 32
Discharge: HOME | End: 2022-08-28
Attending: STUDENT IN AN ORGANIZED HEALTH CARE EDUCATION/TRAINING PROGRAM | Admitting: OBSTETRICS & GYNECOLOGY
Payer: COMMERCIAL

## 2022-08-28 VITALS
TEMPERATURE: 98.6 F | SYSTOLIC BLOOD PRESSURE: 122 MMHG | HEART RATE: 100 BPM | DIASTOLIC BLOOD PRESSURE: 57 MMHG | BODY MASS INDEX: 30.78 KG/M2 | HEIGHT: 61 IN | RESPIRATION RATE: 14 BRPM | WEIGHT: 163 LBS

## 2022-08-28 PROBLEM — R10.2 PELVIC PRESSURE IN PREGNANCY: Status: ACTIVE | Noted: 2022-08-28

## 2022-08-28 PROBLEM — Z3A.36 36 WEEKS GESTATION OF PREGNANCY: Status: ACTIVE | Noted: 2022-08-28

## 2022-08-28 PROBLEM — O26.899 PELVIC PRESSURE IN PREGNANCY: Status: ACTIVE | Noted: 2022-08-28

## 2022-08-28 PROBLEM — O42.90 AMNIOTIC FLUID LEAKING: Status: ACTIVE | Noted: 2022-08-28

## 2022-08-28 LAB
A1 MICROGLOB PLACENTAL VAG QL: NEGATIVE
CONTROL LINE PRESENT?: NORMAL
EXPIRATION DATE: NORMAL
INTERNAL NEGATIVE CONTROL: NORMAL
KIT LOT NO.: NORMAL

## 2022-08-28 PROCEDURE — 76815 OB US LIMITED FETUS(S): CPT

## 2022-08-28 PROCEDURE — 59025 FETAL NON-STRESS TEST: CPT

## 2022-08-28 PROCEDURE — 2709999900 HC NON-CHARGEABLE SUPPLY

## 2022-08-28 PROCEDURE — 99282 EMERGENCY DEPT VISIT SF MDM: CPT

## 2022-08-28 PROCEDURE — 75810000275 HC EMERGENCY DEPT VISIT NO LEVEL OF CARE

## 2022-08-28 PROCEDURE — 84112 EVAL AMNIOTIC FLUID PROTEIN: CPT | Performed by: OBSTETRICS & GYNECOLOGY

## 2022-08-28 NOTE — DISCHARGE SUMMARY
Obstetrical Discharge Summary     Name: Jeffrey Thomas MRN: [id number]  SSN: [id number]    YOB: 1990  Age: 28 y.o. Sex: female      Admit Date: 8/28/2022    Discharge Date: 8/28/2022     Admitting Physician: Regina Blue MD     Attending Physician:  Abdi Means MD     Admission Diagnoses: No admission diagnoses are documented for this encounter. Discharge Diagnoses: This patient has no babies on file. Additional Diagnoses:   Hospital Problems  Date Reviewed: 12/1/2021            Codes Class Noted POA    36 weeks gestation of pregnancy ICD-10-CM: Z3A.36  ICD-9-CM: V22.2  8/28/2022 Yes        * (Principal) Amniotic fluid leaking ICD-10-CM: O42.90  ICD-9-CM: 658.10  8/28/2022 Yes        Pelvic pressure in pregnancy ICD-10-CM: O26.899, R10.2  ICD-9-CM: 646.83, 625.9  8/28/2022 Yes          Lab Results   Component Value Date/Time    Rubella, External Immune 02/14/2022 12:00 AM    GrBStrep, External POSITIVE 02/27/2019 12:00 AM       Immunization(s):   There is no immunization history on file for this patient. Hospital Course: pt admitted for possible leaking fluid amnisure was negative. Had some small variables. BPP 10/10. Variables resolved with PO hydration. The patient was released to her home in good condition. Patient Instructions:     Reference my discharge instructions. I spent 10 minutes discharging the patient in face to face contact. Follow-up Appointments   Procedures    FOLLOW UP VISIT Appointment in: Other (Specify) 2 days as scheduled Dr. Oliva Christian     2 days as scheduled Dr. Oliva Christian     Standing Status:   Standing     Number of Occurrences:   1     Order Specific Question:   Appointment in     Answer:    Other (Specify)        Signed By:  Regina Blue MD     August 28, 2022

## 2022-08-28 NOTE — DISCHARGE INSTRUCTIONS
[employer] Activation    Thank you for requesting access to [employer]. Please follow the instructions below to securely access and download your online medical record. [employer] allows you to send messages to your doctor, view your test results, renew your prescriptions, schedule appointments, and more. How Do I Sign Up? In your internet browser, go to [email or web address]  Click on the First Time User? Click Here link in the Sign In box. You will be redirect to the New Member Sign Up page. Enter your [employer] Access Code exactly as it appears below. You will not need to use this code after youve completed the sign-up process. If you do not sign up before the expiration date, you must request a new code. [employer] Access Code: Activation code not generated  Current [employer] Status: Active (This is the date your [employer]gg[employer]Fin[employer] access code will )    Enter the last four digits of your Social Security Number (xxxx) and Date of Birth (mm/dd/yyyy) as indicated and click Submit. You will be taken to the next sign-up page. Create a [employer] ID. This will be your [employer] login ID and cannot be changed, so think of one that is secure and easy to remember. Create a [employer] password. You can change your password at any time. Enter your Password Reset Question and Answer. This can be used at a later time if you forget your password. Enter your e-mail address. You will receive e-mail notification when new information is available in [address] Click Sign Up. You can now view and download portions of your medical record. Click the Borqs link to download a portable copy of your medical information. Additional Information    If you have questions, please visit the Frequently Asked Questions section of the [employer] website at [email or web address] Remember, [employer] is NOT to be used for urgent needs. For medical emergencies, dial 911.         Pregnancy Precautions: Care Instructions  Your Care Instructions     There is no sure way to prevent labor before your due date ( labor) or to prevent most other pregnancy problems. But there are things you can do to increase your chances of a healthy pregnancy. Go to your appointments, follow your doctor's advice, and take good care of yourself. Eat well, and exercise (if your doctor agrees). And make sure to drink plenty of water. Follow-up care is a key part of your treatment and safety. Be sure to make and go to all appointments, and call your doctor if you are having problems. It's also a good idea to know your test results and keep a list of the medicines you take. How can you care for yourself at home? Make sure you go to your prenatal appointments. At each visit, your doctor will check your blood pressure. Your doctor will also check to see if you have protein in your urine. High blood pressure and protein in urine are signs of preeclampsia. This condition can be dangerous for you and your baby. Drink plenty of fluids. Dehydration can cause contractions. If you have kidney, heart, or liver disease and have to limit fluids, talk with your doctor before you increase the amount of fluids you drink. Tell your doctor right away if you notice any symptoms of an infection, such as:  Burning when you urinate. A foul-smelling discharge from your vagina. Vaginal itching. Unexplained fever. Unusual pain or soreness in your uterus or lower belly. Eat a balanced diet. Include plenty of foods that are high in calcium and iron. Foods high in calcium include milk, cheese, yogurt, almonds, and broccoli. Foods high in iron include red meat, shellfish, poultry, eggs, beans, raisins, whole-grain bread, and leafy green vegetables. Do not smoke. If you need help quitting, talk to your doctor about stop-smoking programs and medicines. These can increase your chances of quitting for good. Do not drink alcohol or use marijuana or illegal drugs.   Follow your doctor's directions about activity. Your doctor will let you know how much, if any, exercise you can do. Ask your doctor if you can have sex. If you are at risk for early labor, your doctor may ask you to not have sex. Take care to prevent falls. During pregnancy, your joints are loose, and your balance is off. Sports such as bicycling, skiing, or in-line skating can increase your risk of falling. And don't ride horses or motorcycles, dive, water ski, scuba dive, or parachute jump while you are pregnant. Avoid things that can make your body too hot and may be harmful to your baby, such as a hot tub or sauna. Or talk with your doctor before doing anything that raises your body temperature. Your doctor can tell you if it's safe. Do not take any over-the-counter or herbal medicines or supplements without talking to your doctor or pharmacist first.  When should you call for help? Call 911  anytime you think you may need emergency care. For example, call if:    You passed out (lost consciousness). You have a seizure. You have severe vaginal bleeding. You have severe pain in your belly or pelvis. You have had fluid gushing or leaking from your vagina and you know or think the umbilical cord is bulging into your vagina. If this happens, immediately get down on your knees so your rear end (buttocks) is higher than your head. This will decrease the pressure on the cord until help arrives. Call your doctor now or seek immediate medical care if:    You have signs of preeclampsia, such as:  Sudden swelling of your face, hands, or feet. New vision problems (such as dimness, blurring, or seeing spots). A severe headache. You have any vaginal bleeding. You have belly pain or cramping. You have a fever. You have had regular contractions (with or without pain) for an hour.  This means that you have 8 or more within 1 hour or 4 or more in 20 minutes after you change your position and drink fluids. You have a sudden release of fluid from your vagina. You have low back pain or pelvic pressure that does not go away. You notice that your baby has stopped moving or is moving much less than normal.   Watch closely for changes in your health, and be sure to contact your doctor if you have any problems. Where can you learn more? Go to http://www.archuleta.com/  Enter Y951 in the search box to learn more about \"Pregnancy Precautions: Care Instructions. \"  Current as of: June 16, 2021               Content Version: 13.2  © 3681-1549 [employer]. Care instructions adapted under license by [employer] (which disclaims liability or warranty for this information). If you have questions about a medical condition or this instruction, always ask your healthcare professional. [address] any warranty or liability for your use of this information.

## 2022-08-28 NOTE — PROGRESS NOTES
1516 Pt arrives to the unit co pressure ad leaking fluid. Pt placed on FHM and TOCO. Amnisure tested negative. PT placed on left side and will continue to monitor. [address] [facility] Dr. Vasquez Richmond regarding pt strip. Baby has good variability with small variable decelerations.  On his way to see pt.     1630 Dr. Vasquez Richmond at the bedside, strip reviewed, ultrasound performed. He verbalizes for  patient to drink fluids versus getting an IV. Will continue to monitor. [address] [facility] Dr. Vasquez Richmond at the bedside, strip reviewed and discusses discharge with pt and RN.

## 2022-08-28 NOTE — H&P
High Risk Obstetrics Antepartum Progress Note    Name: Paty Espinoza MRN: [id number]  SSN: [id number]    YOB: 1990  Age: 28 y.o. Sex: female      Subjective:      LOS: 0 days    Estimated Date of Delivery: 22   Gestational Age Today: 36w7d     Patient admitted for  suspected leaking fluid and pelvic pressure . States she does not have contractions, nausea and vomiting, vaginal bleeding , and reports an active fetus. Admits not drinking much water. Prenatal course has been benign per patient . Objective:     Vitals:  Blood pressure (!) 122/57, pulse 100, temperature 98.6 °F (37 °C), resp. rate 14, height 5' 1\" (1.549 m), weight 163 lb (73.9 kg), last menstrual period 11/15/2021, not currently breastfeeding. Temp (24hrs), Av.6 °F (37 °C), Min:98.6 °F (37 °C), Max:98.6 °F (37 °C)    Systolic (32RKA), ZUJ:652 , Min:122 , EKO:018      Diastolic (12TPZ), RFL:12, Min:57, Max:57       Intake and Output:         Physical Exam:  Patient without distress. Abdomen: soft, nontender  Fundus: soft and non tender  Cervical Exam:      Membranes:  Intact    Uterine Activity:  Frequency: occasional  Duration: 20 seconds  Intensity: mild (patient unawared)    Fetal Heart Rate:  Reactive  Baseline: 140 per minute  Variability: moderate  Accelerations: yes  Decelerations: variable and they are occasional < 20 sec and < 20 below baseline  Uterine contractions: irregular, mild    US   Done at bedside shows higgins vertex with normal MARVEL (122mm), good fetal tone, flexion extension and fetal breathing identified. BPP 10/10      Labs:   Recent Results (from the past 36 hour(s))   RUPTURE OF FETAL MEMBRANES, POC    Collection Time: 22  3:35 PM   Result Value Ref Range    Rupture of fetal membrane Negative Negative    Control line present?  Acceptable     Internal negative control Acceptable     Kit Lot No. [id number]     Expiration date [id number],212        Assessment and Plan:      Principal Problem:    Amniotic fluid leaking (8/28/2022)    Ruled out with Amniosure. MARVEL normal on US  Occ Mild variable (non significant)    Active Problems:    36 weeks gestation of pregnancy (8/28/2022)      Pelvic pressure in pregnancy (8/28/2022)  Cervix closed     Plan  PO Hydration   Monitoring for further variables.     Likely DC if strip remains reassuring     Signed By: Rodo Johnston MD     August 28, 2022

## 2022-08-28 NOTE — PROGRESS NOTES
Discharge instructions given, verbally & preprinted. Pt signs & vebalizes understanding. Ambulates off unit w/steady gait, accompanied by s/o.

## 2022-09-10 ENCOUNTER — ANESTHESIA (OUTPATIENT)
Dept: LABOR AND DELIVERY | Age: 32
DRG: 560 | End: 2022-09-10
Payer: COMMERCIAL

## 2022-09-10 ENCOUNTER — ANESTHESIA EVENT (OUTPATIENT)
Dept: LABOR AND DELIVERY | Age: 32
DRG: 560 | End: 2022-09-10
Payer: COMMERCIAL

## 2022-09-10 ENCOUNTER — HOSPITAL ENCOUNTER (INPATIENT)
Age: 32
LOS: 2 days | Discharge: HOME | DRG: 560 | End: 2022-09-12
Attending: OBSTETRICS & GYNECOLOGY | Admitting: OBSTETRICS & GYNECOLOGY
Payer: COMMERCIAL

## 2022-09-10 PROBLEM — Z34.90 PREGNANCY: Status: ACTIVE | Noted: 2022-09-10

## 2022-09-10 LAB
ERYTHROCYTE [DISTWIDTH] IN BLOOD BY AUTOMATED COUNT: 13.6 % (ref 11.5–14.5)
HCT VFR BLD AUTO: 38.5 % (ref 35–47)
HGB BLD-MCNC: 12.8 G/DL (ref 11.5–16)
MCH RBC QN AUTO: 32.2 PG (ref 26–34)
MCHC RBC AUTO-ENTMCNC: 33.2 G/DL (ref 30–36.5)
MCV RBC AUTO: 97 FL (ref 80–99)
NRBC # BLD: 0 K/UL (ref 0–0.01)
NRBC BLD-RTO: 0 PER 100 WBC
PLATELET # BLD AUTO: 145 K/UL (ref 150–400)
PMV BLD AUTO: 11.4 FL (ref 8.9–12.9)
RBC # BLD AUTO: 3.97 M/UL (ref 3.8–5.2)
WBC # BLD AUTO: 9.2 K/UL (ref 3.6–11)

## 2022-09-10 PROCEDURE — 74011250636 HC RX REV CODE- 250/636: Performed by: OBSTETRICS & GYNECOLOGY

## 2022-09-10 PROCEDURE — 75810000275 HC EMERGENCY DEPT VISIT NO LEVEL OF CARE

## 2022-09-10 PROCEDURE — 76060000078 HC EPIDURAL ANESTHESIA: Performed by: ANESTHESIOLOGY

## 2022-09-10 PROCEDURE — 75410000000 HC DELIVERY VAGINAL/SINGLE: Performed by: OBSTETRICS & GYNECOLOGY

## 2022-09-10 PROCEDURE — 0UQMXZZ REPAIR VULVA, EXTERNAL APPROACH: ICD-10-PCS | Performed by: OBSTETRICS & GYNECOLOGY

## 2022-09-10 PROCEDURE — 75410000002 HC LABOR FEE PER 1 HR: Performed by: OBSTETRICS & GYNECOLOGY

## 2022-09-10 PROCEDURE — 74011000250 HC RX REV CODE- 250: Performed by: ANESTHESIOLOGY

## 2022-09-10 PROCEDURE — 0HQ9XZZ REPAIR PERINEUM SKIN, EXTERNAL APPROACH: ICD-10-PCS | Performed by: OBSTETRICS & GYNECOLOGY

## 2022-09-10 PROCEDURE — 75410000003 HC RECOV DEL/VAG/CSECN EA 0.5 HR: Performed by: OBSTETRICS & GYNECOLOGY

## 2022-09-10 PROCEDURE — 2709999900 HC NON-CHARGEABLE SUPPLY

## 2022-09-10 PROCEDURE — 77030005513 HC CATH URETH FOL11 MDII -B

## 2022-09-10 PROCEDURE — 36415 COLL VENOUS BLD VENIPUNCTURE: CPT

## 2022-09-10 PROCEDURE — 85027 COMPLETE CBC AUTOMATED: CPT

## 2022-09-10 PROCEDURE — 74011250636 HC RX REV CODE- 250/636: Performed by: ANESTHESIOLOGY

## 2022-09-10 PROCEDURE — 77030014125 HC TY EPDRL BBMI -B: Performed by: ANESTHESIOLOGY

## 2022-09-10 PROCEDURE — 65270000029 HC RM PRIVATE

## 2022-09-10 PROCEDURE — 4A1HXCZ MONITORING OF PRODUCTS OF CONCEPTION, CARDIAC RATE, EXTERNAL APPROACH: ICD-10-PCS | Performed by: OBSTETRICS & GYNECOLOGY

## 2022-09-10 PROCEDURE — 74011250637 HC RX REV CODE- 250/637: Performed by: OBSTETRICS & GYNECOLOGY

## 2022-09-10 RX ORDER — SIMETHICONE 80 MG
80 TABLET,CHEWABLE ORAL
Status: DISCONTINUED | OUTPATIENT
Start: 2022-09-10 | End: 2022-09-12 | Stop reason: HOSPADM

## 2022-09-10 RX ORDER — EPHEDRINE SULFATE/0.9% NACL/PF 50 MG/5 ML
20 SYRINGE (ML) INTRAVENOUS
Status: DISCONTINUED | OUTPATIENT
Start: 2022-09-10 | End: 2022-09-10

## 2022-09-10 RX ORDER — IBUPROFEN 800 MG/1
800 TABLET ORAL EVERY 8 HOURS
Status: DISCONTINUED | OUTPATIENT
Start: 2022-09-10 | End: 2022-09-12 | Stop reason: HOSPADM

## 2022-09-10 RX ORDER — ONDANSETRON 2 MG/ML
4 INJECTION INTRAMUSCULAR; INTRAVENOUS
Status: DISCONTINUED | OUTPATIENT
Start: 2022-09-10 | End: 2022-09-10

## 2022-09-10 RX ORDER — SODIUM CHLORIDE, SODIUM LACTATE, POTASSIUM CHLORIDE, CALCIUM CHLORIDE 600; 310; 30; 20 MG/100ML; MG/100ML; MG/100ML; MG/100ML
125 INJECTION, SOLUTION INTRAVENOUS CONTINUOUS
Status: DISCONTINUED | OUTPATIENT
Start: 2022-09-10 | End: 2022-09-10

## 2022-09-10 RX ORDER — NALOXONE HYDROCHLORIDE 0.4 MG/ML
0.4 INJECTION, SOLUTION INTRAMUSCULAR; INTRAVENOUS; SUBCUTANEOUS AS NEEDED
Status: DISCONTINUED | OUTPATIENT
Start: 2022-09-10 | End: 2022-09-10

## 2022-09-10 RX ORDER — OXYTOCIN/RINGER'S LACTATE 30/500 ML
500 PLASTIC BAG, INJECTION (ML) INTRAVENOUS ONCE
Status: DISCONTINUED | OUTPATIENT
Start: 2022-09-10 | End: 2022-09-10

## 2022-09-10 RX ORDER — SODIUM CHLORIDE 0.9 % (FLUSH) 0.9 %
5-40 SYRINGE (ML) INJECTION AS NEEDED
Status: CANCELLED | OUTPATIENT
Start: 2022-09-10

## 2022-09-10 RX ORDER — DOCUSATE SODIUM 100 MG/1
100 CAPSULE, LIQUID FILLED ORAL
Status: DISCONTINUED | OUTPATIENT
Start: 2022-09-10 | End: 2022-09-12 | Stop reason: HOSPADM

## 2022-09-10 RX ORDER — OXYTOCIN/RINGER'S LACTATE 30/500 ML
87.3 PLASTIC BAG, INJECTION (ML) INTRAVENOUS AS NEEDED
Status: DISCONTINUED | OUTPATIENT
Start: 2022-09-10 | End: 2022-09-10

## 2022-09-10 RX ORDER — SODIUM CHLORIDE 0.9 % (FLUSH) 0.9 %
5-40 SYRINGE (ML) INJECTION EVERY 8 HOURS
Status: CANCELLED | OUTPATIENT
Start: 2022-09-10

## 2022-09-10 RX ORDER — LIDOCAINE HYDROCHLORIDE AND EPINEPHRINE 20; 5 MG/ML; UG/ML
INJECTION, SOLUTION EPIDURAL; INFILTRATION; INTRACAUDAL; PERINEURAL AS NEEDED
Status: DISCONTINUED | OUTPATIENT
Start: 2022-09-10 | End: 2022-09-10 | Stop reason: HOSPADM

## 2022-09-10 RX ORDER — FENTANYL/BUPIVACAINE/NS/PF 2-1250MCG
1-16 PREFILLED PUMP RESERVOIR EPIDURAL CONTINUOUS
Status: DISCONTINUED | OUTPATIENT
Start: 2022-09-10 | End: 2022-09-10

## 2022-09-10 RX ORDER — NALOXONE HYDROCHLORIDE 0.4 MG/ML
0.4 INJECTION, SOLUTION INTRAMUSCULAR; INTRAVENOUS; SUBCUTANEOUS AS NEEDED
Status: DISCONTINUED | OUTPATIENT
Start: 2022-09-10 | End: 2022-09-12 | Stop reason: HOSPADM

## 2022-09-10 RX ORDER — BUPIVACAINE HYDROCHLORIDE 2.5 MG/ML
INJECTION, SOLUTION EPIDURAL; INFILTRATION; INTRACAUDAL AS NEEDED
Status: DISCONTINUED | OUTPATIENT
Start: 2022-09-10 | End: 2022-09-10 | Stop reason: HOSPADM

## 2022-09-10 RX ORDER — DIPHENHYDRAMINE HCL 25 MG
25 CAPSULE ORAL
Status: DISCONTINUED | OUTPATIENT
Start: 2022-09-10 | End: 2022-09-12 | Stop reason: HOSPADM

## 2022-09-10 RX ORDER — HYDROMORPHONE HYDROCHLORIDE 1 MG/ML
1 INJECTION, SOLUTION INTRAMUSCULAR; INTRAVENOUS; SUBCUTANEOUS
Status: DISCONTINUED | OUTPATIENT
Start: 2022-09-10 | End: 2022-09-10

## 2022-09-10 RX ORDER — OXYTOCIN/RINGER'S LACTATE 30/500 ML
10 PLASTIC BAG, INJECTION (ML) INTRAVENOUS AS NEEDED
Status: DISCONTINUED | OUTPATIENT
Start: 2022-09-10 | End: 2022-09-12 | Stop reason: HOSPADM

## 2022-09-10 RX ORDER — OXYTOCIN/RINGER'S LACTATE 30/500 ML
10 PLASTIC BAG, INJECTION (ML) INTRAVENOUS AS NEEDED
Status: DISCONTINUED | OUTPATIENT
Start: 2022-09-10 | End: 2022-09-10

## 2022-09-10 RX ORDER — ONDANSETRON 2 MG/ML
4 INJECTION INTRAMUSCULAR; INTRAVENOUS
Status: DISCONTINUED | OUTPATIENT
Start: 2022-09-10 | End: 2022-09-12 | Stop reason: HOSPADM

## 2022-09-10 RX ORDER — OXYTOCIN/RINGER'S LACTATE 30/500 ML
87.3 PLASTIC BAG, INJECTION (ML) INTRAVENOUS AS NEEDED
Status: DISCONTINUED | OUTPATIENT
Start: 2022-09-10 | End: 2022-09-12 | Stop reason: HOSPADM

## 2022-09-10 RX ORDER — HYDROCORTISONE ACETATE PRAMOXINE HCL 2.5; 1 G/100G; G/100G
CREAM TOPICAL AS NEEDED
Status: DISCONTINUED | OUTPATIENT
Start: 2022-09-10 | End: 2022-09-10 | Stop reason: RX

## 2022-09-10 RX ORDER — HYDROMORPHONE HYDROCHLORIDE 1 MG/ML
1 INJECTION, SOLUTION INTRAMUSCULAR; INTRAVENOUS; SUBCUTANEOUS
Status: DISCONTINUED | OUTPATIENT
Start: 2022-09-10 | End: 2022-09-12 | Stop reason: HOSPADM

## 2022-09-10 RX ORDER — BUTALBITAL, ACETAMINOPHEN AND CAFFEINE 50; 325; 40 MG/1; MG/1; MG/1
1 TABLET ORAL
COMMUNITY
End: 2022-09-12

## 2022-09-10 RX ORDER — OXYCODONE AND ACETAMINOPHEN 5; 325 MG/1; MG/1
1 TABLET ORAL
Status: DISCONTINUED | OUTPATIENT
Start: 2022-09-10 | End: 2022-09-12 | Stop reason: HOSPADM

## 2022-09-10 RX ADMIN — BUPIVACAINE HYDROCHLORIDE 8 ML: 2.5 INJECTION, SOLUTION EPIDURAL; INFILTRATION; INTRACAUDAL; PERINEURAL at 10:38

## 2022-09-10 RX ADMIN — LIDOCAINE HYDROCHLORIDE,EPINEPHRINE BITARTRATE 3 ML: 20; .005 INJECTION, SOLUTION EPIDURAL; INFILTRATION; INTRACAUDAL; PERINEURAL at 10:39

## 2022-09-10 RX ADMIN — OXYTOCIN 500 ML/HR: 10 INJECTION, SOLUTION INTRAMUSCULAR; INTRAVENOUS at 16:35

## 2022-09-10 RX ADMIN — IBUPROFEN 800 MG: 800 TABLET, FILM COATED ORAL at 19:02

## 2022-09-10 RX ADMIN — Medication 10 ML/HR: at 11:13

## 2022-09-10 RX ADMIN — SODIUM CHLORIDE, POTASSIUM CHLORIDE, SODIUM LACTATE AND CALCIUM CHLORIDE 500 ML: 600; 310; 30; 20 INJECTION, SOLUTION INTRAVENOUS at 10:30

## 2022-09-10 RX ADMIN — SODIUM CHLORIDE, POTASSIUM CHLORIDE, SODIUM LACTATE AND CALCIUM CHLORIDE 500 ML: 600; 310; 30; 20 INJECTION, SOLUTION INTRAVENOUS at 10:00

## 2022-09-10 RX ADMIN — SODIUM CHLORIDE, POTASSIUM CHLORIDE, SODIUM LACTATE AND CALCIUM CHLORIDE 125 ML/HR: 600; 310; 30; 20 INJECTION, SOLUTION INTRAVENOUS at 10:57

## 2022-09-10 RX ADMIN — SODIUM CHLORIDE, POTASSIUM CHLORIDE, SODIUM LACTATE AND CALCIUM CHLORIDE 1000 ML: 600; 310; 30; 20 INJECTION, SOLUTION INTRAVENOUS at 10:00

## 2022-09-10 NOTE — PROGRESS NOTES
Labor Note    Meron Dumont  [id number]  1990   38w5d      S:  Feeling pressure with contractions    O:    Visit Vitals  /78   Pulse 100   Temp 98.8 °F (37.1 °C)   Resp 16   Ht 5' 1\" (1.549 m)   Wt 77.1 kg (170 lb)   LMP 11/15/2021 (Approximate)   SpO2 100%   BMI 32.12 kg/m²     Cervical Exam  Dilation (cm): 9  Eff: 100 %  Station: 0  Cervical Consistency: Soft  Vaginal exam done by? : RN  Membrane Status: AROM    Patient Vitals for the past 4 hrs: Mode Fetal Heart Rate Variability Decelerations Accelerations RN Reviewed Strip?   09/10/22 1530 External 145 6-25 BPM Early No Yes   09/10/22 1500 External 145 6-25 BPM Variable No Yes   09/10/22 1430 External 135 6-25 BPM Variable Yes Yes   09/10/22 1400 External 140 6-25 BPM None Yes Yes   09/10/22 1330 External 135 6-25 BPM None Yes Yes   09/10/22 1300 US Readjusted -- -- -- -- Yes   09/10/22 1230 External 135 6-25 BPM None Yes Yes   09/10/22 1200 External 135 6-25 BPM None Yes Yes       A/P:  28 y.o.  @ 38w5d - labor     Continue management.    Expect        Corene Halsted, MD  [facility]

## 2022-09-10 NOTE — ANESTHESIA PROCEDURE NOTES
Epidural Block    Patient location during procedure: ICU  Start time: 9/10/2022 10:32 AM  End time: 9/10/2022 10:43 AM  Reason for block: labor epidural  Staffing  Performed: attending   Preanesthetic Checklist  Completed: patient identified, risks and benefits discussed, surgical consent, monitors and equipment checked, pre-op evaluation and timeout performed  Block Placement  Patient position: sitting  Prep: Betadine  Sterility prep: cap, drape, gloves and mask  Sedation level: no sedation  Patient monitoring: heart rate, frequent blood pressure checks and continuous pulse oximetry  Approach: midline  Location: lumbar  Lumbar location: L3-L4  Epidural  Loss of resistance technique: saline  Guidance: landmark technique  Needle  Needle type: Tuohy   Needle gauge: 16 G  Catheter type: multi-orifice  Catheter size: 20 G  Catheter securement method: clear occlusive dressing and surgical tape  Test dose: negative  Assessment  Number of attempts: 1  Procedure assessment: patient tolerated procedure well with no immediate complications

## 2022-09-10 NOTE — H&P
History & Physical    Name: Estella Okeefe MRN: [id number]  SSN: [id number]    YOB: 1990  Age: 28 y.o. Sex: female        Subjective:     Estimated Date of Delivery: 22  OB History    Para Term  AB Living   2 1 1 0 0 1   SAB IAB Ectopic Molar Multiple Live Births   0 0 0 0 0 1      # Outcome Date GA Lbr Dilshad/2nd Weight Sex Delivery Anes PTL Lv   2 Current            1 Term 19 39w4d 05:03 :12 3.47 kg F Jacob Brewer is admitted with pregnancy at 38w5d for active labor. Prenatal course was complicated by  HSV on PPx. No signs of outbreak . Please see prenatal records for details. Started this AM with contractions which have worsened. Past Medical History:   Diagnosis Date    Kidney stone 2020    Migraine with aura      No past surgical history on file. Social History     Occupational History    Not on file   Tobacco Use    Smoking status: Never    Smokeless tobacco: Never   Substance and Sexual Activity    Alcohol use: No    Drug use: Yes     Types: Marijuana    Sexual activity: Yes     Family History   Problem Relation Age of Onset    Diabetes Mother     Hypertension Mother     Diabetes Father     Hypertension Father        No Known Allergies  Prior to Admission medications    Not on File        Review of Systems: A comprehensive review of systems was negative except for that written in the HPI. Objective:     Vitals:  Vitals:    09/10/22 1017   BP: (!) 148/82   Pulse: 100   Resp: 16   Temp: 98.8 °F (37.1 °C)   SpO2: 100%   Weight: 77.1 kg (170 lb)   Height: 5' 1\" (1.549 m)        Physical Exam:  Patient with distress.   Heart: Regular rate and rhythm  Lung: normal respiratory effort  Abdomen: soft, nontender  Fundus: firm and non tender  Perineum: blood absent, amniotic fluid absent  Cervical Exam: 6/BBOW per RN on admission   Lower Extremities: WWP   Membranes:  Intact  Fetal Heart Rate: Reactive    Prenatal Labs:   Lab Results   Component Value Date/Time    Rubella, External Immune 02/14/2022 12:00 AM    GrBStrep, External POSITIVE 02/27/2019 12:00 AM    HBsAg, External Negative 02/14/2022 12:00 AM    HIV, External Negative 02/14/2022 12:00 AM    RPR, External Non Reactive 02/14/2022 12:00 AM    Gonorrhea, External Negative 02/07/2022 12:00 AM    Chlamydia, External Negative 02/07/2022 12:00 AM    ABO,Rh A Positive 02/14/2022 12:00 AM     Lab Results   Component Value Date/Time    WBC 9.2 09/10/2022 10:06 AM    HGB 12.8 09/10/2022 10:06 AM    HCT 38.5 09/10/2022 10:06 AM    PLATELET 476 (L) 71/04/4963 10:06 AM    MCV 97.0 09/10/2022 10:06 AM         Assessment/Plan:     Active Problems:    Pregnancy (9/10/2022)         Plan: Admit for Reassuring fetal status, Labor  Progressing normally  Continue expectant management, Continue plan for vaginal delivery. Group B Strep was negative. She is not anemic. Epidural asap. No s/sx hsv outbreak.      Romain Orellana MD  [facility]

## 2022-09-10 NOTE — PROGRESS NOTES
1855: Bedside and Verbal shift change report given to RYLAND Bernardo, ANGELA (oncoming nurse) by CINTHIA Khan RN (offgoing nurse). Report included the following information SBAR, Intake/Output, MAR, Recent Results, and Quality Measures. 1900: Patient standing at bedside with this RN. Pericare performed. Patient declines urge to void. 2 Hour QBL: 98 ml   Total QBL: 298 ml    2025: Patient voiding 500ml urine with this RN at bedside. Magdalene-care performed and return demonstration completed. 2030: TRANSFER - OUT REPORT:    Verbal/Bedside report given to Rodrigo Rosario RN(name) on [facility]  being transferred to MIU(unit) for routine progression of care       Report consisted of patients Situation, Background, Assessment and   Recommendations(SBAR). Information from the following report(s) SBAR, Intake/Output, MAR, Recent Results, and Quality Measures was reviewed with the receiving nurse. Lines:   Peripheral IV 09/10/22 Posterior;Right Hand (Active)   Site Assessment Clean, dry, & intact 09/10/22 1900   Phlebitis Assessment 0 09/10/22 1900   Infiltration Assessment 0 09/10/22 1900   Dressing Status Clean, dry, & intact 09/10/22 1900   Dressing Type Tape;Transparent 09/10/22 1900   Hub Color/Line Status Green;Capped 09/10/22 1900   Action Taken Dressing reinforced; Open ports on tubing capped 09/10/22 1900   Alcohol Cap Used Yes 09/10/22 1900        Opportunity for questions and clarification was provided.       Patient transported with:   Registered Nurse

## 2022-09-10 NOTE — ANESTHESIA PREPROCEDURE EVALUATION
Relevant Problems   No relevant active problems       Anesthetic History   No history of anesthetic complications            Review of Systems / Medical History  Patient summary reviewed and pertinent labs reviewed    Pulmonary  Within defined limits                 Neuro/Psych   Within defined limits           Cardiovascular  Within defined limits                Exercise tolerance: >4 METS     GI/Hepatic/Renal         Renal disease: stones       Endo/Other  Within defined limits           Other Findings              Physical Exam    Airway  Mallampati: II  TM Distance: 4 - 6 cm  Neck ROM: normal range of motion   Mouth opening: Normal     Cardiovascular  Regular rate and rhythm,  S1 and S2 normal,  no murmur, click, rub, or gallop  Rhythm: regular  Rate: normal         Dental  No notable dental hx       Pulmonary  Breath sounds clear to auscultation               Abdominal  GI exam deferred       Other Findings            Anesthetic Plan    ASA: 2  Anesthesia type: epidural            Anesthetic plan and risks discussed with: Patient      Charting completed after epidural placement.

## 2022-09-10 NOTE — L&D DELIVERY NOTE
Delivery Note:     Patient reached FD and pushed with good effort to deliver the fetal head in ALISA position. Ond loose nuchal cord found and reduced. The anterior shoulder, followed by the posterior shoulder and the rest of the body then delivered easily. This was a VMI with Apgars of 8 and 8 at 1 and 5 minutes respectively, weight pending. The infant was placed on mom's abdomen. The cord was then double clamped and cut by the FOB. Cord blood was taken. The placenta followed spontaneously, intact, with 3VC. Pitocin was added to the IVF and the fundus was firm to palpation. The vagina, cervix and perineum were examined and a small 1st degree eprineal laceration was found and left labial was found and both repaired with figure of 8 of 2-0 chromic.  mL. No complications. Mom and baby doing well. Dr. Contreras Core delivering.      Ambika Trujillo MD  [facility]

## 2022-09-10 NOTE — DISCHARGE INSTRUCTIONS
Discharge Instructions for Vaginal Delivery    Patient ID:  Cassie Chang  [id number]  14 y.o.  1990    Take Home Medications       Continue taking your prenatal vitamins if you are breastfeeding. Follow-up care is a key part of your treatment and safety. Please schedule and keep appointments. Follow-up with your primary OB in 6 weeks. Activity  Avoid anything in your vagina for 6 weeks (no intercourse, tampons, or douching). You may drive unless you are taking prescription pain medications. Climbing stairs and light lifting are okay. Please avoid excessive exercise, though walking is okay- you'll be tired! Diet  Regular diet as tolerated. Be sure to drink plenty of fluids if you are breastfeeding. Wound care  If you have stitches, continue to rinse with a squirt bottle of warm water each time you void for about 7-10 days. .  Your stitches will gradually dissolve over four to eight weeks. Sitz baths are also helpful to keep the wound clean, encourage healing, and to help with pain associated with the stitches or hemorrhoids. You can use either a sitz bath basin or a bathtub filled with 2-3\" inches of plain warm water. Soak for 10 minutes 3 times a day as tolerated. Pain Management  Over the counter medications such as Tylenol and ibuprofen (Motrin or Advil) are ideal.  These may be taken together, alternating doses. You may  take the maximum dose:  Motrin or Advil (generic ibuprofen), either 3 tablets every 6 hours or 4 tablets every 8 hours or Tylenol (acetominophen) 1000mg every 6 hours (equivalent to 2 extra strength Tylenol). You may also have a precrescription for stronger pain medication. Take only as needed and transition to over the counter medication in the next few days. Minimize amounts of the prescription medication, as it can be habit-forming and will worsen or cause constipation.  Most patients will find that within a couple of days, their pain is adequately controlled using only over-the-counter medications. The prescription pain medication is mixed with Tylenol, therefore, you should not take any extra Tylenol or acetaminophen until you have reduced your prescription pain medication. Add heating pad or sitz baths as needed. Add hemorrhoid wipes or ointments if needed    Constipation  Constipation is normal after pregnancy and delivery, especially while taking prescription narcotic pain medication. Over the counter remedies including ducosate (Colace), take 1-2 capsules 1-2 times daily for soft stool as needed. You may also add/ try milk of magnesia or rectal remedies such as Dulcolax or Fleets enema. Recovery: What to Expect at Home  Fatigue is expected. Try to rest when you can and don't worry about doing housework or other tasks which can wait. The soreness along your bottom will improve significantly over the first 2 weeks, but it may take 6 weeks before you are completely recovered. Back pain or general body aches or muscle soreness are expected and should improve with acetominophen or ibuprofen. Leg swelling due to pregnancy and/or IV fluids given in the hospital will take about two weeks to resolve. Most women experience some form of the \"Baby Blues\" after having a baby. Feeling emotional, tearful, frustrated, anxious, sad, and irritable some of the time is normal and go away after about 2 weeks. Adequate rest and help from your family will help. Take breaks from caring for the baby. Call your doctor if your symptoms seem severe, last more than 2 weeks, or seem to be getting worse instead of better. Get help immediately if you have thoughts of wanting to hurt yourself or others! Call your doctor or seek immediate medical care if you have:  Heavy vaginal bleeding, soaking through one or more pads an hour for several hours. Foul-smelling discharge from your vagina or incision.   Consistent nausea and vomiting and cannot keep fluids down.  Consistent pain that does not get better after you take pain medicine.   Sudden chest pain and shortness of breath  Signs of a blood clot: pain/ swelling/ increasing redness in your lower extremeties  Signs of infection: increased pain in your abdomen or vaginal area; red streaks, warmth, or tenderness of your breasts; fever of 100.5 F or greater

## 2022-09-10 NOTE — DISCHARGE SUMMARY
Obstetrical Discharge Summary     Name: Darylene Levins MRN: [id number]  SSN: [id number]    YOB: 1990  Age: 28 y.o. Sex: female      Allergies: Patient has no known allergies. Admit Date: 9/10/2022    Discharge Date: 2022     Admitting Physician: Checo Esparza MD     Attending Physician:  Raffaele Rivers MD     * Admission Diagnoses: Pregnancy [Z34.90]    * Discharge Diagnoses:   Information for the patient's :  Aguila Arreola [[id number]]   Delivery of a   child infant via  on 9/10/2022 at   by  . Apgars were   and  .     1st degree perineal lac      Additional Diagnoses:   Hospital Problems as of 9/10/2022 Date Reviewed: 2021            Codes Class Noted - Resolved POA    Pregnancy ICD-10-CM: Z34.90  ICD-9-CM: V22.2  9/10/2022 - Present Unknown          Lab Results   Component Value Date/Time    Rubella, External Immune 2022 12:00 AM    GrBStrep, External POSITIVE 2019 12:00 AM    ABO,Rh A Positive 2022 12:00 AM      There is no immunization history on file for this patient. * Procedures:         * Discharge Condition: [facility] Course: Normal hospital course following the delivery. * Disposition: Home    Discharge Medications:   Current Discharge Medication List          * Follow-up Care/Patient Instructions:   Activity: Activity as tolerated  Diet: Regular Diet  Wound Care: As directed    Follow-up Information    None

## 2022-09-10 NOTE — PROGRESS NOTES
Labor Progress Note:    Key Events:     9/10/2022  -Admitted to labor and delivery in active labor  -@~1030 Epidural placed  -@ 1506 AROM with large amount of clear fluid      SUBJECTIVE:  Alicia feeling pressure. Concerned about passing out when pushing as she believes she is hypoglycemic. Partner in room and supportive. OBJECTIVE:  Patient Vitals for the past 4 hrs:   BP   09/10/22 1428 120/78   09/10/22 1358 (!) 107/56   09/10/22 1313 (!) 100/56   09/10/22 1258 (!) 109/59   09/10/22 1244 110/62         Gen: alert and oriented, appears comfortable  FHT Baseline: 145 bpm, moderate variability, accelerations present, decelerations intermittent variables with terrance to 100 bpm  TOCO: q 2-2.5 minutes, 60+ seconds  Cervix: 9 cm/100%/0 station. Originally thought there was only an anterior lip. After attempting to reduce cervix with one push discontinued pushing as noted to be dilated to 9 cm. MEMBRANES: AROM @ 1641 with large amount of clear fluid    ASSESSMENT:  28 y.o.  at 38w5d   Cat II tracing due to variable decelerations  GBS Negative    PLAN:  -Offered clear fluids. Patient desires a Popsicle. Emesis following eating the Popsicle.  -Discussed option to rupture membranes or continue expectant management. Ed Shove in agreement with artificial rupture of membranes following eating Popsicle.  Membranes ruptured without difficulty.   -Encourage frequent position changes and upright positions to facilitate labor progression and fetal descent  -Reevaluate when [place] feels constant rectal pressure  -Anticipate spontaneous vaginal delivery    Thomas Milner CNM

## 2022-09-11 PROCEDURE — 74011250637 HC RX REV CODE- 250/637: Performed by: OBSTETRICS & GYNECOLOGY

## 2022-09-11 PROCEDURE — 65270000029 HC RM PRIVATE

## 2022-09-11 RX ADMIN — IBUPROFEN 800 MG: 800 TABLET, FILM COATED ORAL at 11:01

## 2022-09-11 RX ADMIN — IBUPROFEN 800 MG: 800 TABLET, FILM COATED ORAL at 03:10

## 2022-09-11 RX ADMIN — IBUPROFEN 800 MG: 800 TABLET, FILM COATED ORAL at 18:44

## 2022-09-11 RX ADMIN — Medication: at 16:00

## 2022-09-11 RX ADMIN — Medication: at 09:00

## 2022-09-11 NOTE — PROGRESS NOTES
PostPartum Note    Corrinne Shu  [id number]  1990  28 y.o.    S:  Ms. Corrinne Shu is a 28 y.o.  PPD #1 s/p  @ 38w5d. Doing well. She had a baby boy. Her lochia is like a period. She describes her pain as mild and is well controlled with PO medications. She is ambulating and voiding. Tolerating PO intake. O:   Visit Vitals  /71 (BP Patient Position: At rest)   Pulse 74   Temp 97.6 °F (36.4 °C)   Resp 16   Ht 5' 1\" (1.549 m)   Wt 77.1 kg (170 lb)   LMP 11/15/2021 (Approximate)   SpO2 96%   Breastfeeding Unknown   BMI 32.12 kg/m²       Lab Results   Component Value Date/Time    WBC 9.2 09/10/2022 10:06 AM    HGB 12.8 09/10/2022 10:06 AM    HCT 38.5 09/10/2022 10:06 AM    PLATELET 302 (L)  10:06 AM    MCV 97.0 09/10/2022 10:06 AM       Gen - No acute distress  Abdomen - Fundus firm, below the umbilicus   Ext - Warm, well perfused. Nontender    A/P:  PPD #1 s/p  @ 38w5d doing well. 1.  Routine PP instructions/ care discussed  2. Blood type - Rh +  3. Rubella imm  4. Circumcision - attempted today, however baby not nursing well and asked to defer. 5.  Discharge PPD#2    6. F/U 4-6 weeks for PP check.       Ankush Gresham MD  [facility]

## 2022-09-12 VITALS
RESPIRATION RATE: 16 BRPM | TEMPERATURE: 98.2 F | BODY MASS INDEX: 32.1 KG/M2 | SYSTOLIC BLOOD PRESSURE: 115 MMHG | WEIGHT: 170 LBS | HEART RATE: 78 BPM | OXYGEN SATURATION: 97 % | HEIGHT: 61 IN | DIASTOLIC BLOOD PRESSURE: 69 MMHG

## 2022-09-12 PROCEDURE — 2709999900 HC NON-CHARGEABLE SUPPLY

## 2022-09-12 PROCEDURE — 74011250637 HC RX REV CODE- 250/637: Performed by: OBSTETRICS & GYNECOLOGY

## 2022-09-12 RX ADMIN — IBUPROFEN 800 MG: 800 TABLET, FILM COATED ORAL at 02:19

## 2022-09-12 RX ADMIN — IBUPROFEN 800 MG: 800 TABLET, FILM COATED ORAL at 09:36

## 2022-09-12 NOTE — PROGRESS NOTES
9/12/2022  2:47 PM  CM received return call from FW and appt scheduled with Dr. Abad Gaitan for 1wk follow up. 9/19 @ 3:20pm. CM called FRANDY and provided her with update. 2:35 PM  CM consult noted for EDPS score of 14. CM met with MOB to complete initial assessment and begin discharge planning. MOB verified and confirmed demographics. FRANDY lives with ABILIO- Chris Hastings, and their 1 yr old, at the address on file. FRANDY is not employed and plans to be home with infant. FOB is employed and will be taking the rest of this week off. FRANDY reports she has little support, but feels like she has the support she needs when she returns home. FRANDY plans to breast feed baby and has pump to use at home. Leela Ewing will provide follow up care for infant. FRANDY has car seat, bassinet/crib, clothing, bottles and all necessary supplies for baby. FRANDY has [facility], and will be adding baby to this policy. CM discussed process to add baby to insurance, MOB verbalized understanding. CM discussed previous history of PP depression with her daughter. MOB noted that she just \"worked through it\". CM discussed resources through medication, counseling, support groups. FRANDY verbalized understanding and noted she would be agreeable to counseling and support groups. FRANDY denied needing medications at this time. CM provided FRANDY with [address] information, as well as Hotline. CM also provided list of outpt counseling/therapy. CM provided MOB with support group information. CM discussed with FOMAURICE the importance of support and when to call her doctor. CM confirmed with FRANDY that she does not have current thoughts of suicide and does not have a plan. FRANDY is agreeable for CM to schedule a 1wk follow up with her OB. CM called and left msg on queue for return call to schedule appt for her. Care Management Interventions  PCP Verified by CM: Yes (FW.)  Mode of Transport at Discharge:  Other (see comment)  Transition of Care Consult (CM Consult): Discharge Planning  Support Systems: Spouse/Significant Other  Confirm Follow Up Transport: Family  Discharge Location  Patient Expects to be Discharged to[de-identified] Home with family assistance  Gisel Patrick

## 2022-09-12 NOTE — LACTATION NOTE
This note was copied from a baby's chart. MOB states that infant is nursing well. This is her 2nd baby to breast feed and she is planning on going home today. LC offered some education on breastfeeding the first few days. LC encouraged mom to breastfeed on demand and ensure 8-12 feedings in a day. Diaper output, breast feeding positioning, infant weight loss and gain discussed, as well as engorgement management and what to expect when her milk comes in. MOB is familiar and states that she will call with further questions or need for assistance. Breastfeeding booklet provides. Mother's nipples are sore, therefore JN, shells, lanolin and hydrogel pads were provided. .Shayla Medin may be tender the first week. Use lanolin on nipples after nursing. Hale's cream advised for damaged nipples. May use shells to keep clothing off skin and allow air to help heal. Soft gels may be purchased  for usage. Engorgement Care Guidelines:  Reviewed how milk is made and normal phases of milk production. Taught care of engorged breasts - frequent breastfeeding encouraged, cool packs and motrin as tolerated. Anticipatory guidance shared. Reviewed breastfeeding basics:  How milk is made and normal  breastfeeding behaviors discussed. Supply and demand,  stomach size, early feeding cues, skin to skin bonding with comfortable positioning and baby led latch-on reviewed. How to identify signs of successful breastfeeding sessions reviewed; education on asymetrical latch, signs of effective latching vs shallow, in-effective latching, normal  feeding frequency and duration and expected infant output discussed. Normal course of breastfeeding discussed including the AAP's recommendation that children receive exclusive breast milk feedings for the first six months of life with breast milk feedings to continue through the first year of life and/or beyond as complimentary table foods are added.   Breastfeeding Booklet and Warm line information provided with discussion. Discussed typical  weight loss and the importance of pediatrician appointment within 24-48 hours of discharge, at 2 weeks of life and normalcy of requesting pediatric weight checks as needed in between visits. Pt will successfully establish breastfeeding by feeding in response to early feeding cues   or wake every 3h, will obtain deep latch, and will keep log of feedings/output. Taught to BF at hunger cues and or q 2-3 hrs and to offer 10-20 drops of hand expressed colostrum at any non-feeds. Breast Assessment  Left Breast:  (did not assess)  Breast- Feeding Assessment  Attends Breast-Feeding Classes: No  Breast-Feeding Experience: Yes  Breast Trauma/Surgery: No  Type/Quality: Good  Lactation Consultant Visits  Breast-Feedings:  (did not observe)     LATCH Documentation  Latch:  (LC has not observed, pt to call)    Discussed with mother her plan for feeding. Reviewed the benefits of exclusive breast milk feeding during the hospital stay. Informed mother of the risks of using formula to supplement in the first few days of life as well as the benefits of successful breast milk feeding; referred mother to the handout in her admission packet related to these topics. Mother acknowledges understanding of information reviewed and states that it is her plan tobreast milk feed exclusively her infant. Will support her choice and offer additional information as needed.

## 2022-09-12 NOTE — ROUTINE PROCESS
Bedside and Verbal shift change report given to [address] [place] [address] (oncoming nurse) by Michele Muniz RN (offgoing nurse). Report included the following information SBAR, Kardex, MAR, Accordion, and Recent Results.

## 2022-09-12 NOTE — PROGRESS NOTES
PostPartum Note    Meron Dumont  [id number]  1990  28 y.o.    S:  Ms. Meron Dumont is a 28 y.o.  PPD #2 s/p  @ 38w5d. Doing well. She had a baby boy. Her lochia is like a period. She describes her pain as mild and is well controlled with PO medications. She is breast feeding and this is going well. She is ambulating and voiding. Tolerating PO intake. O:   Visit Vitals  /69 (BP 1 Location: Right upper arm, BP Patient Position: At rest)   Pulse 78   Temp 98.2 °F (36.8 °C)   Resp 16   Ht 5' 1\" (1.549 m)   Wt 77.1 kg (170 lb)   LMP 11/15/2021 (Approximate)   SpO2 97%   Breastfeeding Unknown   BMI 32.12 kg/m²       Lab Results   Component Value Date/Time    WBC 9.2 09/10/2022 10:06 AM    HGB 12.8 09/10/2022 10:06 AM    HCT 38.5 09/10/2022 10:06 AM    PLATELET 054 (L)  10:06 AM    MCV 97.0 09/10/2022 10:06 AM       Gen - No acute distress  Abdomen - Fundus firm, below the umbilicus   Ext - Warm, well perfused. Nontender    A/P:  PPD #2 s/p  @ 38w5d doing well. - EPDS score 14 -- discussed with patient, she denies any thoughts of self harm or suicidality, no homicidal thoughts. Declined medication. Will have case management see her prior to discharge today, provide resources on therapists to followup with. Told to call on call doctor or go to ED if she has thoughts of SI/HI. Will see in office in 1-2 weeks for followup. 1.  Routine PP instructions/ care discussed  2. Blood type - Rh positive  3. Circumcision performed this AM   5. Discharge today   6. F/U 1-2 weeks for postpartum depression visit, 4-6 weeks for PP check.       Maddi Medrano MD  [facility]

## 2023-10-02 ENCOUNTER — CLINICAL DOCUMENTATION (OUTPATIENT)
Age: 33
End: 2023-10-02

## 2024-03-02 ENCOUNTER — APPOINTMENT (OUTPATIENT)
Facility: HOSPITAL | Age: 34
End: 2024-03-02
Payer: COMMERCIAL

## 2024-03-02 ENCOUNTER — HOSPITAL ENCOUNTER (EMERGENCY)
Facility: HOSPITAL | Age: 34
Discharge: HOME | End: 2024-03-02
Attending: STUDENT IN AN ORGANIZED HEALTH CARE EDUCATION/TRAINING PROGRAM
Payer: COMMERCIAL

## 2024-03-02 VITALS
WEIGHT: 141.76 LBS | HEART RATE: 74 BPM | HEIGHT: 61 IN | DIASTOLIC BLOOD PRESSURE: 65 MMHG | OXYGEN SATURATION: 100 % | TEMPERATURE: 97.8 F | SYSTOLIC BLOOD PRESSURE: 129 MMHG | RESPIRATION RATE: 18 BRPM | BODY MASS INDEX: 26.76 KG/M2

## 2024-03-02 DIAGNOSIS — R10.31 ABDOMINAL PAIN, RIGHT LOWER QUADRANT: Primary | ICD-10-CM

## 2024-03-02 LAB
ALBUMIN SERPL-MCNC: 3.8 G/DL (ref 3.5–5)
ALBUMIN/GLOB SERPL: 1.2 (ref 1.1–2.2)
ALP SERPL-CCNC: 71 U/L (ref 45–117)
ALT SERPL-CCNC: 15 U/L (ref 12–78)
ANION GAP SERPL CALC-SCNC: 1 MMOL/L (ref 5–15)
APPEARANCE UR: CLEAR
AST SERPL-CCNC: 6 U/L (ref 15–37)
BACTERIA URNS QL MICRO: NEGATIVE /HPF
BASOPHILS # BLD: 0 K/UL (ref 0–0.1)
BASOPHILS NFR BLD: 0 % (ref 0–1)
BILIRUB SERPL-MCNC: 0.4 MG/DL (ref 0.2–1)
BILIRUB UR QL: NEGATIVE
BUN SERPL-MCNC: 10 MG/DL (ref 6–20)
BUN/CREAT SERPL: 15 (ref 12–20)
CALCIUM SERPL-MCNC: 8.8 MG/DL (ref 8.5–10.1)
CHLORIDE SERPL-SCNC: 111 MMOL/L (ref 97–108)
CO2 SERPL-SCNC: 30 MMOL/L (ref 21–32)
COLOR UR: ABNORMAL
CREAT SERPL-MCNC: 0.68 MG/DL (ref 0.55–1.02)
DIFFERENTIAL METHOD BLD: NORMAL
EOSINOPHIL # BLD: 0.1 K/UL (ref 0–0.4)
EOSINOPHIL NFR BLD: 2 % (ref 0–7)
EPITH CASTS URNS QL MICRO: ABNORMAL /LPF
ERYTHROCYTE [DISTWIDTH] IN BLOOD BY AUTOMATED COUNT: 12.2 % (ref 11.5–14.5)
GLOBULIN SER CALC-MCNC: 3.2 G/DL (ref 2–4)
GLUCOSE SERPL-MCNC: 107 MG/DL (ref 65–100)
GLUCOSE UR STRIP.AUTO-MCNC: NEGATIVE MG/DL
HCG UR QL: NEGATIVE
HCT VFR BLD AUTO: 41.2 % (ref 35–47)
HGB BLD-MCNC: 14.1 G/DL (ref 11.5–16)
HGB UR QL STRIP: NEGATIVE
HYALINE CASTS URNS QL MICRO: ABNORMAL /LPF (ref 0–2)
IMM GRANULOCYTES # BLD AUTO: 0 K/UL (ref 0–0.04)
IMM GRANULOCYTES NFR BLD AUTO: 0 % (ref 0–0.5)
KETONES UR QL STRIP.AUTO: ABNORMAL MG/DL
LEUKOCYTE ESTERASE UR QL STRIP.AUTO: NEGATIVE
LYMPHOCYTES # BLD: 2.2 K/UL (ref 0.8–3.5)
LYMPHOCYTES NFR BLD: 30 % (ref 12–49)
MCH RBC QN AUTO: 32 PG (ref 26–34)
MCHC RBC AUTO-ENTMCNC: 34.2 G/DL (ref 30–36.5)
MCV RBC AUTO: 93.6 FL (ref 80–99)
MONOCYTES # BLD: 0.4 K/UL (ref 0–1)
MONOCYTES NFR BLD: 6 % (ref 5–13)
NEUTS SEG # BLD: 4.7 K/UL (ref 1.8–8)
NEUTS SEG NFR BLD: 62 % (ref 32–75)
NITRITE UR QL STRIP.AUTO: NEGATIVE
NRBC # BLD: 0 K/UL (ref 0–0.01)
NRBC BLD-RTO: 0 PER 100 WBC
PH UR STRIP: 5.5 (ref 5–8)
PLATELET # BLD AUTO: 222 K/UL (ref 150–400)
PMV BLD AUTO: 10.7 FL (ref 8.9–12.9)
POTASSIUM SERPL-SCNC: 4 MMOL/L (ref 3.5–5.1)
PROT SERPL-MCNC: 7 G/DL (ref 6.4–8.2)
PROT UR STRIP-MCNC: NEGATIVE MG/DL
RBC # BLD AUTO: 4.4 M/UL (ref 3.8–5.2)
RBC #/AREA URNS HPF: ABNORMAL /HPF (ref 0–5)
SODIUM SERPL-SCNC: 142 MMOL/L (ref 136–145)
SP GR UR REFRACTOMETRY: 1.02 (ref 1–1.03)
URINE CULTURE IF INDICATED: ABNORMAL
UROBILINOGEN UR QL STRIP.AUTO: 0.2 EU/DL (ref 0.2–1)
WBC # BLD AUTO: 7.5 K/UL (ref 3.6–11)
WBC URNS QL MICRO: ABNORMAL /HPF (ref 0–4)

## 2024-03-02 PROCEDURE — 80053 COMPREHEN METABOLIC PANEL: CPT

## 2024-03-02 PROCEDURE — 2580000003 HC RX 258: Performed by: STUDENT IN AN ORGANIZED HEALTH CARE EDUCATION/TRAINING PROGRAM

## 2024-03-02 PROCEDURE — 6360000002 HC RX W HCPCS: Performed by: STUDENT IN AN ORGANIZED HEALTH CARE EDUCATION/TRAINING PROGRAM

## 2024-03-02 PROCEDURE — 96374 THER/PROPH/DIAG INJ IV PUSH: CPT

## 2024-03-02 PROCEDURE — 6360000004 HC RX CONTRAST MEDICATION: Performed by: STUDENT IN AN ORGANIZED HEALTH CARE EDUCATION/TRAINING PROGRAM

## 2024-03-02 PROCEDURE — 81025 URINE PREGNANCY TEST: CPT

## 2024-03-02 PROCEDURE — 36415 COLL VENOUS BLD VENIPUNCTURE: CPT

## 2024-03-02 PROCEDURE — 81001 URINALYSIS AUTO W/SCOPE: CPT

## 2024-03-02 PROCEDURE — 74177 CT ABD & PELVIS W/CONTRAST: CPT

## 2024-03-02 PROCEDURE — 85025 COMPLETE CBC W/AUTO DIFF WBC: CPT

## 2024-03-02 PROCEDURE — 99285 EMERGENCY DEPT VISIT HI MDM: CPT

## 2024-03-02 RX ORDER — ONDANSETRON 4 MG/1
4 TABLET, ORALLY DISINTEGRATING ORAL 3 TIMES DAILY PRN
Qty: 9 TABLET | Refills: 0 | Status: SHIPPED | OUTPATIENT
Start: 2024-03-02 | End: 2024-03-05

## 2024-03-02 RX ORDER — 0.9 % SODIUM CHLORIDE 0.9 %
500 INTRAVENOUS SOLUTION INTRAVENOUS ONCE
Status: COMPLETED | OUTPATIENT
Start: 2024-03-02 | End: 2024-03-02

## 2024-03-02 RX ORDER — KETOROLAC TROMETHAMINE 15 MG/ML
15 INJECTION, SOLUTION INTRAMUSCULAR; INTRAVENOUS ONCE
Status: COMPLETED | OUTPATIENT
Start: 2024-03-02 | End: 2024-03-02

## 2024-03-02 RX ORDER — KETOROLAC TROMETHAMINE 10 MG/1
10 TABLET, FILM COATED ORAL EVERY 6 HOURS PRN
Qty: 12 TABLET | Refills: 0 | Status: SHIPPED | OUTPATIENT
Start: 2024-03-02 | End: 2024-03-05

## 2024-03-02 RX ADMIN — KETOROLAC TROMETHAMINE 15 MG: 15 INJECTION, SOLUTION INTRAMUSCULAR; INTRAVENOUS at 07:54

## 2024-03-02 RX ADMIN — IOPAMIDOL 100 ML: 755 INJECTION, SOLUTION INTRAVENOUS at 08:42

## 2024-03-02 RX ADMIN — SODIUM CHLORIDE 500 ML: 9 INJECTION, SOLUTION INTRAVENOUS at 07:54

## 2024-03-02 ASSESSMENT — PAIN - FUNCTIONAL ASSESSMENT: PAIN_FUNCTIONAL_ASSESSMENT: 0-10

## 2024-03-02 ASSESSMENT — PAIN DESCRIPTION - DESCRIPTORS: DESCRIPTORS: DULL

## 2024-03-02 ASSESSMENT — PAIN SCALES - GENERAL
PAINLEVEL_OUTOF10: 7
PAINLEVEL_OUTOF10: 3

## 2024-03-02 ASSESSMENT — PAIN DESCRIPTION - LOCATION
LOCATION: ABDOMEN
LOCATION: ABDOMEN

## 2024-03-02 ASSESSMENT — LIFESTYLE VARIABLES
HOW OFTEN DO YOU HAVE A DRINK CONTAINING ALCOHOL: NEVER
HOW MANY STANDARD DRINKS CONTAINING ALCOHOL DO YOU HAVE ON A TYPICAL DAY: PATIENT DOES NOT DRINK

## 2024-03-02 ASSESSMENT — PAIN DESCRIPTION - ORIENTATION: ORIENTATION: RIGHT;LOWER

## 2024-03-02 NOTE — ED PROVIDER NOTES
available at the time of this note:    CT ABDOMEN PELVIS W IV CONTRAST Additional Contrast? None   Final Result      1. No evidence of acute process in the abdomen or pelvis. Normal appendix.   2. A 3 mm nonobstructing stone in the upper pole of the left kidney.                 LABS:  Labs Reviewed   COMPREHENSIVE METABOLIC PANEL W/ REFLEX TO MG FOR LOW K - Abnormal; Notable for the following components:       Result Value    Chloride 111 (*)     Anion Gap 1 (*)     Glucose 107 (*)     AST 6 (*)     All other components within normal limits   URINALYSIS WITH REFLEX TO CULTURE - Abnormal; Notable for the following components:    Ketones, Urine TRACE (*)     Epithelial Cells UA MANY (*)     All other components within normal limits   CBC WITH AUTO DIFFERENTIAL   POC PREGNANCY UR-QUAL   POC PREGNANCY UR-QUAL       All other labs were within normal range or not returned as of this dictation.    EMERGENCY DEPARTMENT COURSE and DIFFERENTIAL DIAGNOSIS/MDM:   Vitals:    Vitals:    03/02/24 0720 03/02/24 0730 03/02/24 0745   BP: 123/82 104/63 (!) 99/54   Pulse: 76     Resp: 18     Temp: 98.5 °F (36.9 °C)     TempSrc: Oral     SpO2: 100% 98% 98%   Weight: 64.3 kg (141 lb 12.1 oz)     Height: 1.549 m (5' 1\")             Medical Decision Making  Ovarian cyst, appendicitis.  34-year-old female present emergency department with intermittent right lower quadrant abdominal pain.  Low suspicion for appendicitis due to chronic in nature will obtain labs likely ultrasound will reassess.    Amount and/or Complexity of Data Reviewed  Labs: ordered.  Radiology: ordered.    Risk  Prescription drug management.            REASSESSMENT        9:54 AM    UA, labs as well as CT imaging unremarkable.  No evidence of UTI incidental finding of a 3 mm nonobstructing renal calculi in the upper pole of the left kidney.  Discussed this finding with the patient as well as no emergent pathology to explain her right lower quadrant abdominal pain.   Offered patient ultrasound now versus outpatient with follow-up with her OB/GYN patient would like to call her OB/GYN on Monday to have this scheduled.  Will discharge patient with Toradol, Zofran for symptoms.    CONSULTS:  None    PROCEDURES:  Unless otherwise noted below, none     Procedures      FINAL IMPRESSION      1. Abdominal pain, right lower quadrant          DISPOSITION/PLAN   DISPOSITION Decision To Discharge 03/02/2024 09:53:20 AM      PATIENT REFERRED TO:  Roxy Andrea, APRN - NP  [address] [facility]  [address]  [facility] [address]  [phone]    Schedule an appointment as soon as possible for a visit   If symptoms worsen    [facility] EMERGENCY DEPT  [address] [facility] [address]  [phone]    If symptoms worsen      DISCHARGE MEDICATIONS:  New Prescriptions    KETOROLAC (TORADOL) 10 MG TABLET    Take 1 tablet by mouth every 6 hours as needed for Pain    ONDANSETRON (ZOFRAN-ODT) 4 MG DISINTEGRATING TABLET    Take 1 tablet by mouth 3 times daily as needed for Nausea or Vomiting         (Please note that portions of this note were completed with a voice recognition program.  Efforts were made to edit the dictations but occasionally words are mis-transcribed.)    Jose Guan MD (electronically signed)  Emergency Attending Physician / Physician Assistant / Nurse Practitioner             Jose Guan MD  03/02/24 9698

## 2024-03-02 NOTE — ED TRIAGE NOTES
Patient arrives to ed via pov with c/o right lower abdominal pain that is radiating around to right flank x 1 month. Pt sts the pain worsens with walking, when she urinates and has bowel movement. Patient sts she has been getting worse and pt sts she is unable to tolerate it anymore. Pt sts she has not been seen for the pain. Pt sts she has had kidney stone in the past but does not feel similar. Pt sts she was nauseous last night and \"threw up a little bit\", pt does endorse some diarrhea.

## 2024-06-28 ENCOUNTER — CLINICAL DOCUMENTATION (OUTPATIENT)
Age: 34
End: 2024-06-28

## 2025-01-15 ENCOUNTER — TELEPHONE (OUTPATIENT)
Facility: CLINIC | Age: 35
End: 2025-01-15

## 2025-01-15 NOTE — TELEPHONE ENCOUNTER
----- Message from Neris FINK sent at 1/15/2025  5:01 PM EST -----  Regarding: [facility] Appointment Request  [facility] Appointment Request    Patient needs appointment for [facility] Appointment Type: Annual Visit.    Patient Requested Dates(s): Any date available   Patient Requested Time: Early Morning  Provider Name: Roxy HELEN Andrea, APRN-CNP    Reason for Appointment Request: Established Patient - No appointments available during search  --------------------------------------------------------------------------------------------------------------------------    Relationship to Patient: Self     Call Back Information: OK to leave message on voicemail  Preferred Call Back Number: Phone

## 2025-01-16 NOTE — TELEPHONE ENCOUNTER
Returned pts call to make her a new pt apt, spoke to pt and explained she would be a new pt and gave her the names of the providers that are taking new pts. She stated she would give us a call back.

## 2025-03-26 ENCOUNTER — OFFICE VISIT (OUTPATIENT)
Facility: CLINIC | Age: 35
End: 2025-03-26
Payer: COMMERCIAL

## 2025-03-26 VITALS
SYSTOLIC BLOOD PRESSURE: 113 MMHG | TEMPERATURE: 98 F | DIASTOLIC BLOOD PRESSURE: 77 MMHG | HEART RATE: 85 BPM | WEIGHT: 137.9 LBS | HEIGHT: 61 IN | OXYGEN SATURATION: 98 % | BODY MASS INDEX: 26.04 KG/M2

## 2025-03-26 DIAGNOSIS — G43.709 CHRONIC MIGRAINE WITHOUT AURA WITHOUT STATUS MIGRAINOSUS, NOT INTRACTABLE: ICD-10-CM

## 2025-03-26 DIAGNOSIS — Z13.220 SCREENING, LIPID: ICD-10-CM

## 2025-03-26 DIAGNOSIS — Z13.0 SCREENING, ANEMIA, DEFICIENCY, IRON: ICD-10-CM

## 2025-03-26 DIAGNOSIS — K21.9 GASTROESOPHAGEAL REFLUX DISEASE, UNSPECIFIED WHETHER ESOPHAGITIS PRESENT: ICD-10-CM

## 2025-03-26 DIAGNOSIS — Z13.1 SCREENING FOR DIABETES MELLITUS: ICD-10-CM

## 2025-03-26 DIAGNOSIS — Z13.29 SCREENING FOR THYROID DISORDER: ICD-10-CM

## 2025-03-26 DIAGNOSIS — Z00.00 ENCOUNTER FOR WELL ADULT EXAM WITHOUT ABNORMAL FINDINGS: Primary | ICD-10-CM

## 2025-03-26 PROCEDURE — 99385 PREV VISIT NEW AGE 18-39: CPT | Performed by: NURSE PRACTITIONER

## 2025-03-26 RX ORDER — FAMOTIDINE 20 MG/1
20 TABLET, FILM COATED ORAL 2 TIMES DAILY PRN
Qty: 60 TABLET | Refills: 3 | Status: SHIPPED | OUTPATIENT
Start: 2025-03-26

## 2025-03-26 RX ORDER — RIZATRIPTAN BENZOATE 10 MG/1
10 TABLET ORAL
Qty: 27 TABLET | Refills: 1 | Status: SHIPPED | OUTPATIENT
Start: 2025-03-26

## 2025-03-26 RX ORDER — CEFUROXIME AXETIL 250 MG/1
TABLET ORAL
COMMUNITY
Start: 2025-03-24

## 2025-03-26 SDOH — ECONOMIC STABILITY: FOOD INSECURITY: WITHIN THE PAST 12 MONTHS, THE FOOD YOU BOUGHT JUST DIDN'T LAST AND YOU DIDN'T HAVE MONEY TO GET MORE.: NEVER TRUE

## 2025-03-26 SDOH — ECONOMIC STABILITY: FOOD INSECURITY: WITHIN THE PAST 12 MONTHS, YOU WORRIED THAT YOUR FOOD WOULD RUN OUT BEFORE YOU GOT MONEY TO BUY MORE.: NEVER TRUE

## 2025-03-26 ASSESSMENT — PATIENT HEALTH QUESTIONNAIRE - PHQ9
SUM OF ALL RESPONSES TO PHQ QUESTIONS 1-9: 0
1. LITTLE INTEREST OR PLEASURE IN DOING THINGS: NOT AT ALL
2. FEELING DOWN, DEPRESSED OR HOPELESS: NOT AT ALL
SUM OF ALL RESPONSES TO PHQ QUESTIONS 1-9: 0

## 2025-03-26 NOTE — PROGRESS NOTES
Progress Note        Pt presents for PE and labs  Has reflux sx at least once a week  Has been rx'd prilosec but reluctant to take b/c she doesn't like to take medicine  Also has migraines - has been rx'd med but is reluctant to take medicine - open to starting something today    Gastroesophageal Reflux         Subjective:     Patient's medications, allergies, past medical, surgical, social and family histories were reviewed and updated as appropriate.    Review of Systems   All other systems reviewed and are negative.       Objective:     Vitals:    03/26/25 1051   BP: 113/77   Pulse: 85   Temp: 98 °F (36.7 °C)   SpO2: 98%   Weight: 62.6 kg (137 lb 14.4 oz)   Height: 1.549 m (5' 1\")       Physical Exam  Vitals and nursing note reviewed.   Constitutional:       General: She is not in acute distress.     Appearance: Normal appearance. She is normal weight. She is not ill-appearing.   HENT:      Head: Normocephalic and atraumatic.      Right Ear: Tympanic membrane, ear canal and external ear normal. There is no impacted cerumen.      Left Ear: Tympanic membrane, ear canal and external ear normal. There is no impacted cerumen.      Nose: Nose normal.      Mouth/Throat:      Mouth: Mucous membranes are moist.      Pharynx: Oropharynx is clear.   Eyes:      Extraocular Movements: Extraocular movements intact.      Conjunctiva/sclera: Conjunctivae normal.      Pupils: Pupils are equal, round, and reactive to light.   Neck:      Vascular: No carotid bruit.   Cardiovascular:      Rate and Rhythm: Normal rate and regular rhythm.      Pulses: Normal pulses.      Heart sounds: Normal heart sounds.   Pulmonary:      Effort: Pulmonary effort is normal.      Breath sounds: Normal breath sounds.   Abdominal:      General: Abdomen is flat. Bowel sounds are normal. There is no distension.      Palpations: Abdomen is soft. There is no mass.      Tenderness: There is no abdominal tenderness. There is no right CVA tenderness, left 
Worried About Running Out of Food in the Last Year: Never true     Ran Out of Food in the Last Year: Never true   Transportation Needs: No Transportation Needs (3/26/2025)    PRAPARE - Transportation     Lack of Transportation (Medical): No     Lack of Transportation (Non-Medical): No   Physical Activity: Not on file   Stress: Not on file   Social Connections: Not on file   Intimate Partner Violence: Not on file   Depression: Not at risk (3/26/2025)    PHQ-2     PHQ-2 Score: 0   Housing Stability: Low Risk  (3/26/2025)    Housing Stability Vital Sign     Unable to Pay for Housing in the Last Year: No     Number of Times Moved in the Last Year: 0     Homeless in the Last Year: No   Interpersonal Safety: Not on file   Utilities: Not At Risk (3/26/2025)    [facility] Utilities     Threatened with loss of utilities: No       Click Here for Release of Records Request

## 2025-03-27 LAB
ALBUMIN SERPL-MCNC: 4 G/DL (ref 3.5–5)
ALBUMIN/GLOB SERPL: 1.2 (ref 1.1–2.2)
ALP SERPL-CCNC: 73 U/L (ref 45–117)
ALT SERPL-CCNC: 20 U/L (ref 12–78)
ANION GAP SERPL CALC-SCNC: 2 MMOL/L (ref 2–12)
AST SERPL-CCNC: 18 U/L (ref 15–37)
BASOPHILS # BLD: 0.02 K/UL (ref 0–0.1)
BASOPHILS NFR BLD: 0.3 % (ref 0–1)
BILIRUB SERPL-MCNC: 0.4 MG/DL (ref 0.2–1)
BUN SERPL-MCNC: 11 MG/DL (ref 6–20)
BUN/CREAT SERPL: 17 (ref 12–20)
CALCIUM SERPL-MCNC: 8.9 MG/DL (ref 8.5–10.1)
CHLORIDE SERPL-SCNC: 107 MMOL/L (ref 97–108)
CHOLEST SERPL-MCNC: 174 MG/DL
CO2 SERPL-SCNC: 29 MMOL/L (ref 21–32)
CREAT SERPL-MCNC: 0.64 MG/DL (ref 0.55–1.02)
DIFFERENTIAL METHOD BLD: NORMAL
EOSINOPHIL # BLD: 0.19 K/UL (ref 0–0.4)
EOSINOPHIL NFR BLD: 3.2 % (ref 0–7)
ERYTHROCYTE [DISTWIDTH] IN BLOOD BY AUTOMATED COUNT: 11.9 % (ref 11.5–14.5)
EST. AVERAGE GLUCOSE BLD GHB EST-MCNC: 91 MG/DL
GLOBULIN SER CALC-MCNC: 3.4 G/DL (ref 2–4)
GLUCOSE SERPL-MCNC: 91 MG/DL (ref 65–100)
HBA1C MFR BLD: 4.8 % (ref 4–5.6)
HCT VFR BLD AUTO: 41.6 % (ref 35–47)
HDLC SERPL-MCNC: 46 MG/DL
HDLC SERPL: 3.8 (ref 0–5)
HGB BLD-MCNC: 13.7 G/DL (ref 11.5–16)
IMM GRANULOCYTES # BLD AUTO: 0.02 K/UL (ref 0–0.04)
IMM GRANULOCYTES NFR BLD AUTO: 0.3 % (ref 0–0.5)
LDLC SERPL CALC-MCNC: 107 MG/DL (ref 0–100)
LYMPHOCYTES # BLD: 2.48 K/UL (ref 0.8–3.5)
LYMPHOCYTES NFR BLD: 42.1 % (ref 12–49)
MCH RBC QN AUTO: 31.4 PG (ref 26–34)
MCHC RBC AUTO-ENTMCNC: 32.9 G/DL (ref 30–36.5)
MCV RBC AUTO: 95.4 FL (ref 80–99)
MONOCYTES # BLD: 0.36 K/UL (ref 0–1)
MONOCYTES NFR BLD: 6.1 % (ref 5–13)
NEUTS SEG # BLD: 2.82 K/UL (ref 1.8–8)
NEUTS SEG NFR BLD: 48 % (ref 32–75)
NRBC # BLD: 0 K/UL (ref 0–0.01)
NRBC BLD-RTO: 0 PER 100 WBC
PLATELET # BLD AUTO: 203 K/UL (ref 150–400)
PMV BLD AUTO: 11 FL (ref 8.9–12.9)
POTASSIUM SERPL-SCNC: 4 MMOL/L (ref 3.5–5.1)
PROT SERPL-MCNC: 7.4 G/DL (ref 6.4–8.2)
RBC # BLD AUTO: 4.36 M/UL (ref 3.8–5.2)
SODIUM SERPL-SCNC: 138 MMOL/L (ref 136–145)
T4 FREE SERPL-MCNC: 0.8 NG/DL (ref 0.8–1.5)
TRIGL SERPL-MCNC: 105 MG/DL
TSH SERPL DL<=0.05 MIU/L-ACNC: 0.99 UIU/ML (ref 0.36–3.74)
VLDLC SERPL CALC-MCNC: 21 MG/DL
WBC # BLD AUTO: 5.9 K/UL (ref 3.6–11)

## 2025-03-28 ENCOUNTER — RESULTS FOLLOW-UP (OUTPATIENT)
Facility: CLINIC | Age: 35
End: 2025-03-28